# Patient Record
Sex: FEMALE | Race: BLACK OR AFRICAN AMERICAN | Employment: FULL TIME | URBAN - METROPOLITAN AREA
[De-identification: names, ages, dates, MRNs, and addresses within clinical notes are randomized per-mention and may not be internally consistent; named-entity substitution may affect disease eponyms.]

---

## 2020-02-04 ENCOUNTER — HOSPITAL ENCOUNTER (OUTPATIENT)
Dept: CT IMAGING | Age: 30
Discharge: HOME | End: 2020-02-04
Payer: COMMERCIAL

## 2020-02-04 PROCEDURE — 70450 CT HEAD/BRAIN W/O DYE: CPT

## 2020-11-20 ENCOUNTER — HOSPITAL ENCOUNTER (INPATIENT)
Age: 30
LOS: 1 days | Discharge: HOME | DRG: 159 | End: 2020-11-22
Attending: EMERGENCY MEDICINE | Admitting: INTERNAL MEDICINE
Payer: COMMERCIAL

## 2020-11-20 ENCOUNTER — APPOINTMENT (OUTPATIENT)
Dept: CT IMAGING | Age: 30
DRG: 159 | End: 2020-11-20
Payer: COMMERCIAL

## 2020-11-20 LAB
A/G RATIO: 1.4 (ref 1.1–2.2)
ALBUMIN SERPL-MCNC: 4.5 G/DL (ref 3.4–5)
ALP BLD-CCNC: 152 U/L (ref 40–129)
ALT SERPL-CCNC: 22 U/L (ref 10–40)
ANION GAP SERPL CALCULATED.3IONS-SCNC: 10 MMOL/L (ref 3–16)
AST SERPL-CCNC: 18 U/L (ref 15–37)
BASOPHILS ABSOLUTE: 0.1 K/UL (ref 0–0.2)
BASOPHILS RELATIVE PERCENT: 0.9 %
BILIRUB SERPL-MCNC: <0.2 MG/DL (ref 0–1)
BUN BLDV-MCNC: 13 MG/DL (ref 7–20)
C-REACTIVE PROTEIN: 24.9 MG/L (ref 0–5.1)
CALCIUM SERPL-MCNC: 9.1 MG/DL (ref 8.3–10.6)
CHLORIDE BLD-SCNC: 101 MMOL/L (ref 99–110)
CO2: 26 MMOL/L (ref 21–32)
CREAT SERPL-MCNC: 0.9 MG/DL (ref 0.6–1.1)
EOSINOPHILS ABSOLUTE: 0.7 K/UL (ref 0–0.6)
EOSINOPHILS RELATIVE PERCENT: 8.3 %
GFR AFRICAN AMERICAN: >60
GFR NON-AFRICAN AMERICAN: >60
GLOBULIN: 3.2 G/DL
GLUCOSE BLD-MCNC: 95 MG/DL (ref 70–99)
HCG QUALITATIVE: NEGATIVE
HCT VFR BLD CALC: 38.2 % (ref 36–48)
HEMOGLOBIN: 12.1 G/DL (ref 12–16)
LACTIC ACID: 0.6 MMOL/L (ref 0.4–2)
LYMPHOCYTES ABSOLUTE: 3.3 K/UL (ref 1–5.1)
LYMPHOCYTES RELATIVE PERCENT: 41.3 %
MCH RBC QN AUTO: 22.6 PG (ref 26–34)
MCHC RBC AUTO-ENTMCNC: 31.6 G/DL (ref 31–36)
MCV RBC AUTO: 71.5 FL (ref 80–100)
MONOCYTES ABSOLUTE: 0.5 K/UL (ref 0–1.3)
MONOCYTES RELATIVE PERCENT: 6.3 %
NEUTROPHILS ABSOLUTE: 3.5 K/UL (ref 1.7–7.7)
NEUTROPHILS RELATIVE PERCENT: 43.2 %
PDW BLD-RTO: 15.8 % (ref 12.4–15.4)
PLATELET # BLD: 226 K/UL (ref 135–450)
PMV BLD AUTO: 9.2 FL (ref 5–10.5)
POTASSIUM REFLEX MAGNESIUM: 3.7 MMOL/L (ref 3.5–5.1)
RBC # BLD: 5.34 M/UL (ref 4–5.2)
SEDIMENTATION RATE, ERYTHROCYTE: 14 MM/HR (ref 0–20)
SODIUM BLD-SCNC: 137 MMOL/L (ref 136–145)
TOTAL PROTEIN: 7.7 G/DL (ref 6.4–8.2)
WBC # BLD: 8.1 K/UL (ref 4–11)

## 2020-11-20 PROCEDURE — 96374 THER/PROPH/DIAG INJ IV PUSH: CPT

## 2020-11-20 PROCEDURE — 2500000003 HC RX 250 WO HCPCS: Performed by: NURSE PRACTITIONER

## 2020-11-20 PROCEDURE — 85652 RBC SED RATE AUTOMATED: CPT

## 2020-11-20 PROCEDURE — 6360000004 HC RX CONTRAST MEDICATION: Performed by: NURSE PRACTITIONER

## 2020-11-20 PROCEDURE — 36415 COLL VENOUS BLD VENIPUNCTURE: CPT

## 2020-11-20 PROCEDURE — 2580000003 HC RX 258: Performed by: NURSE PRACTITIONER

## 2020-11-20 PROCEDURE — 96375 TX/PRO/DX INJ NEW DRUG ADDON: CPT

## 2020-11-20 PROCEDURE — 86160 COMPLEMENT ANTIGEN: CPT

## 2020-11-20 PROCEDURE — 99285 EMERGENCY DEPT VISIT HI MDM: CPT

## 2020-11-20 PROCEDURE — 6360000002 HC RX W HCPCS: Performed by: NURSE PRACTITIONER

## 2020-11-20 PROCEDURE — 70487 CT MAXILLOFACIAL W/DYE: CPT

## 2020-11-20 PROCEDURE — 80053 COMPREHEN METABOLIC PANEL: CPT

## 2020-11-20 PROCEDURE — 85025 COMPLETE CBC W/AUTO DIFF WBC: CPT

## 2020-11-20 PROCEDURE — 96372 THER/PROPH/DIAG INJ SC/IM: CPT

## 2020-11-20 PROCEDURE — 83605 ASSAY OF LACTIC ACID: CPT

## 2020-11-20 PROCEDURE — 86140 C-REACTIVE PROTEIN: CPT

## 2020-11-20 PROCEDURE — 84703 CHORIONIC GONADOTROPIN ASSAY: CPT

## 2020-11-20 RX ORDER — DEXAMETHASONE SODIUM PHOSPHATE 10 MG/ML
10 INJECTION INTRAMUSCULAR; INTRAVENOUS ONCE
Status: COMPLETED | OUTPATIENT
Start: 2020-11-20 | End: 2020-11-20

## 2020-11-20 RX ORDER — DIPHENHYDRAMINE HYDROCHLORIDE 50 MG/ML
12.5 INJECTION INTRAMUSCULAR; INTRAVENOUS ONCE
Status: COMPLETED | OUTPATIENT
Start: 2020-11-20 | End: 2020-11-20

## 2020-11-20 RX ORDER — 0.9 % SODIUM CHLORIDE 0.9 %
1000 INTRAVENOUS SOLUTION INTRAVENOUS ONCE
Status: COMPLETED | OUTPATIENT
Start: 2020-11-20 | End: 2020-11-20

## 2020-11-20 RX ORDER — EPINEPHRINE 1 MG/ML
0.3 INJECTION, SOLUTION, CONCENTRATE INTRAVENOUS ONCE
Status: COMPLETED | OUTPATIENT
Start: 2020-11-20 | End: 2020-11-20

## 2020-11-20 RX ADMIN — DIPHENHYDRAMINE HYDROCHLORIDE 12.5 MG: 50 INJECTION, SOLUTION INTRAMUSCULAR; INTRAVENOUS at 19:37

## 2020-11-20 RX ADMIN — FAMOTIDINE 20 MG: 10 INJECTION, SOLUTION INTRAVENOUS at 19:37

## 2020-11-20 RX ADMIN — DEXAMETHASONE SODIUM PHOSPHATE 10 MG: 10 INJECTION INTRAMUSCULAR; INTRAVENOUS at 23:31

## 2020-11-20 RX ADMIN — EPINEPHRINE 0.3 MG: 1 INJECTION, SOLUTION, CONCENTRATE INTRAVENOUS at 22:29

## 2020-11-20 RX ADMIN — IOPAMIDOL 75 ML: 755 INJECTION, SOLUTION INTRAVENOUS at 21:10

## 2020-11-20 RX ADMIN — SODIUM CHLORIDE 1000 ML: 9 INJECTION, SOLUTION INTRAVENOUS at 19:36

## 2020-11-20 NOTE — LETTER
Felicia Sargent was admitted from 11/20/2020-11/22/2020. If you have any questions or concerns, please don't hesitate to call.       [facility]  [phone]

## 2020-11-20 NOTE — ED PROVIDER NOTES
[address] [facility]  ED  EMERGENCY DEPARTMENT ENCOUNTER        Pt Name: Tory Vela  MRN: [id number]  Nataliyagfabbie 1990  Date of evaluation: 11/20/2020  Provider: KATHY Camarena - NENA  PCP: Saima Morales MD     I have seen and evaluated this patient with my supervising physician Dr. Puentes       Chief Complaint   Patient presents with    Oral Swelling     Upper lip swelling. Pt started new birth control monday, otherwise no recent dietary/medication changes. No SOB, itching, hives, NKA. HISTORY OF PRESENT ILLNESS   (Location, Timing/Onset, Context/Setting, Quality, Duration, Modifying Factors, Severity, Associated Signs and Symptoms)  Note limiting factors. Tory Vela is a 26 y.o. female with medical history of asthma, HPV genital warts, hypertension, trichomonas, iron deficiency anemia who presents the ED with complaints of swelling to her left upper lip that has started this morning when patient awoke. She said she took Benadryl at 11 AM and did not notice any relief in the symptoms. She has noticed progressive swelling to her left upper lip that now extends into her left cheek since arrival to the ED. She does note to taking lisinopril and said her mother had experienced angioedema from lisinopril twice, however her entire upper lip was swollen. Patient did note to start a new birth control Elizabeth 5 days ago and currently has the Nexplanon in that is due to be removed next month. She denies any open wounds, scratches, injuries, or trauma. She denies any known allergies to any medications, detergents, lotions, or soaps. She does note to have sensitive skin and sometimes whenever she uses Chapstick it will make her face breakout. She denies any drooling, dysphagia, voice change, short of air, cough, wheezing, stridor, nausea, vomiting, diarrhea, headache, neck pain, visual disturbance, dental pain or swelling,fevers, or  hoarseness.   She says she is currently working from home. She notes that she did not take her blood pressure medicine today due to the swelling. She does smoke, denies alcohol use or street drugs. Nursing Notes were all reviewed and agreed with or any disagreements were addressed in the HPI. REVIEW OF SYSTEMS    (2-9 systems for level 4, 10 or more for level 5)     Review of Systems    Positives and Pertinent negatives as per HPI. Except as noted above in the ROS, all other systems were reviewed and negative. PAST MEDICAL HISTORY     Past Medical History:   Diagnosis Date    Asthma     Seasonal    HPV-genital warts 2009    Hypertension     Trichomoniasis          SURGICAL HISTORY   History reviewed. No pertinent surgical history. CURRENTMEDICATIONS       Previous Medications    ALBUTEROL SULFATE HFA (VENTOLIN HFA) 108 (90 BASE) MCG/ACT INHALER    Inhale 2 puffs into the lungs every 6 hours as needed for Wheezing    FERROUS SULFATE 325 (65 FE) MG TABLET    Take 325 mg by mouth daily (with breakfast)    IBUPROFEN (ADVIL;MOTRIN) 100 MG/5ML SUSPENSION    Take 40 mLs by mouth every 8 hours as needed for Pain (for mild pain level 1-3). IBUPROFEN (ADVIL;MOTRIN) 800 MG TABLET    Take 1 tablet by mouth every 8 hours as needed. IBUPROFEN (IBU) 600 MG TABLET    Take 1 tablet by mouth every 6 hours as needed for Pain. LISINOPRIL-HYDROCHLOROTHIAZIDE (PRINZIDE;ZESTORETIC) 20-25 MG PER TABLET    Take 1 tablet by mouth daily    ONDANSETRON (ZOFRAN) 4 MG TABLET    Take 1 tablet by mouth every 8 hours as needed for Nausea or Vomiting. TRIAMCINOLONE (KENALOG) 0.1 % CREAM    Apply topically 2 times daily Apply topically 2 times daily. ALLERGIES     Patient has no known allergies.     FAMILYHISTORY       Family History   Problem Relation Age of Onset    Asthma Mother     Miscarriages / [place] Mother     High Blood Pressure Father     Mental Illness Brother     Substance Abuse Maternal Grandmother     Diabetes Paternal Grandmother     High Blood Pressure Paternal Grandmother     Diabetes Paternal Grandfather     High Blood Pressure Paternal Grandfather           SOCIAL HISTORY       Social History     Tobacco Use    Smoking status: Current Every Day Smoker     Packs/day: 0.25     Years: 0.00     Pack years: 0.00     Types: Cigarettes    Smokeless tobacco: Never Used   Substance Use Topics    Alcohol use: No    Drug use: No       SCREENINGS    Elijah Coma Scale  Eye Opening: Spontaneous  Best Verbal Response: Oriented  Best Motor Response: Obeys commands  [place] Coma Scale Score: 15        PHYSICAL EXAM    (up to 7 for level 4, 8 or more for level 5)     ED Triage Vitals   BP Temp Temp Source Pulse Resp SpO2 Height Weight   11/20/20 1731 11/20/20 1727 11/20/20 1731 11/20/20 1727 11/20/20 1731 11/20/20 1727 11/20/20 1731 11/20/20 1731   (!) 133/90 96.9 °F (36.1 °C) Oral 109 16 99 % 5' 5\" (1.651 m) 183 lb (83 kg)       Physical Exam  Vitals signs and nursing note reviewed. Constitutional:       General: She is awake. Appearance: Normal appearance. She is well-developed and overweight. HENT:      Head: Normocephalic and atraumatic. Jaw: There is normal jaw occlusion. No trismus. Right Ear: Hearing normal.      Left Ear: Hearing normal.      Nose: Nose normal. No nasal deformity or signs of injury. Mouth/Throat:      Mouth: Mucous membranes are moist.      Dentition: Normal dentition. No dental abscesses. Pharynx: Oropharynx is clear. Uvula midline. Comments: Edema to left upper lip with extending into the left cheek causing flattening of the nasolabial fold. No edema into the hard or soft palate. No subungual edema. No voice change or drooling. No obvious dental infection or abscess. Eyes:      General:         Right eye: No discharge. Left eye: No discharge. Neck:      Musculoskeletal: Normal range of motion.    Cardiovascular:      Rate and Rhythm: Normal rate and regular rhythm. Heart sounds: Normal heart sounds. Pulmonary:      Effort: Pulmonary effort is normal. No respiratory distress. Breath sounds: Normal breath sounds. Abdominal:      General: Bowel sounds are normal.      Palpations: Abdomen is soft. Tenderness: There is no abdominal tenderness. Musculoskeletal: Normal range of motion. Lymphadenopathy:      Head:      Right side of head: No submandibular adenopathy. Left side of head: No submandibular adenopathy. Skin:     General: Skin is warm and dry. Coloration: Skin is not pale. Neurological:      Mental Status: She is alert and oriented to person, place, and time. Psychiatric:         Behavior: Behavior normal. Behavior is cooperative.          DIAGNOSTIC RESULTS   LABS:    Labs Reviewed   CBC WITH AUTO DIFFERENTIAL - Abnormal; Notable for the following components:       Result Value    RBC 5.34 (*)     MCV 71.5 (*)     MCH 22.6 (*)     RDW 15.8 (*)     Eosinophils Absolute 0.7 (*)     All other components within normal limits    Narrative:     Performed at:  [place] [address] [address]   Phone [phone]   COMPREHENSIVE METABOLIC PANEL W/ REFLEX TO MG FOR LOW K - Abnormal; Notable for the following components:    Alkaline Phosphatase 152 (*)     All other components within normal limits    Narrative:     Performed at:  [facility] - [place] [address] [address]   Phone [phone]   CULTURE, BLOOD 1   CULTURE, BLOOD 2   LACTIC ACID, PLASMA    Narrative:     Performed at:  [facility]) [place] [address] [address]   Phone [phone]   SEDIMENTATION RATE    Narrative:     Performed at:  [facility] - [place] [address] [address]   Phone [phone]   HCG, SERUM, QUALITATIVE    Narrative:     Performed at:  [facility] - [address], [address] [facility] [place]   Phone [phone]   C-REACTIVE PROTEIN       All other labs were within normal range or not returned as of this dictation. EKG: All EKG's are interpreted by the Emergency Department Physician in the absence of a cardiologist.  Please see their note for interpretation of EKG. RADIOLOGY:   Non-plain film images such as CT, Ultrasound and MRI are read by the radiologist. Plain radiographic images are visualized and preliminarily interpreted by the ED Provider with the below findings:        Interpretation per the Radiologist below, if available at the time of this note:    CT FACIAL BONES W CONTRAST   Final Result   Findings of cellulitis involving the left upper lip and face. No evidence of   abscess. No results found.         PROCEDURES   Unless otherwise noted below, none     Procedures    CRITICAL CARE TIME   N/A    CONSULTS:  IP CONSULT TO HOSPITALIST      EMERGENCY DEPARTMENT COURSE and DIFFERENTIAL DIAGNOSIS/MDM:   Vitals:    Vitals:    11/20/20 1947 11/20/20 1957 11/20/20 2048 11/20/20 2217   BP: (!) 123/90  (!) 140/93 (!) 127/93   Pulse: 58 58 63 58   Resp: 20 22 19   Temp:       TempSrc:       SpO2: 100%  100% 100%   Weight:       Height:           Patient was given the following medications:  Medications   dexamethasone (DECADRON) injection 10 mg (has no administration in time range)   clindamycin (CLEOCIN) 600 mg in dextrose 5% 50 mL IVPB (has no administration in time range)   diphenhydrAMINE (BENADRYL) injection 12.5 mg (12.5 mg Intravenous Given 11/20/20 1937)   famotidine (PEPCID) injection 20 mg (20 mg Intravenous Given 11/20/20 1937)   0.9 % sodium chloride bolus (0 mLs Intravenous Stopped 11/20/20 2057)   iopamidol (ISOVUE-370) 76 % injection 75 mL (75 mLs Intravenous Given 11/20/20 2110)   EPINEPHrine PF 1 MG/ML injection 0.3 mg (0.3 mg Intramuscular Given 11/20/20 2229)           Pertinent Labs & Imaging studies reviewed. (See chart for details)   -  Patient seen and evaluated in the emergency department. -  Triage and nursing notes reviewed and incorporated. -  Old chart records reviewed and incorporated. -  Patient case discussed with attending physician, Dr. Kevin Avilez. They saw and examined patient. -  Differential diagnosis includes: cellulitis, kaye's angina, dental abscess, angioedema, allergic reaction, vs COVID-19.  -  Work-up included:  See above blood culture x2, hCG, CBC, lactic acid, CMP, ESR, CRP, CT facial bones with contrast  -  ED treatment included: Pepcid, normal saline, epi, Decadron, Benadryl, clindamycin  - Consults: Hospitalist  -  Results discussed with patient. Labs show  CBC with WBC 8.1, RBC 5.34, MCH 22.6, RDW 15.8, MCV 71.5, absolute eosinophils 0.7. Lactic acid 0.6. CMP with alk phos 152. ESR 14. hCG negative. CT facial bones with contrast shows findings of cellulitis involving the left upper lip and face. No evidence of abscess. Patient was given a dose of epinephrine and rechecked. Is then noted that she now has angioedema to her right upper lip. Patient said this is much worse than previous. The patient is agreeable with plan of care and disposition.  -  Disposition:  Admission    FINAL IMPRESSION      1. Angioedema, initial encounter    2.  Essential hypertension          DISPOSITION/PLAN   DISPOSITION    Admission         (Please note that portions of this note were completed with a voice recognition program.  Efforts were made to edit the dictations but occasionally words are mis-transcribed.)    KATHY Garcia CNP (electronically signed)            KATHY Garcia CNP  11/21/20 9689

## 2020-11-21 PROBLEM — L03.90 CELLULITIS: Status: ACTIVE | Noted: 2020-11-21

## 2020-11-21 LAB
A/G RATIO: 1.3 (ref 1.1–2.2)
ALBUMIN SERPL-MCNC: 4.1 G/DL (ref 3.4–5)
ALP BLD-CCNC: 139 U/L (ref 40–129)
ALT SERPL-CCNC: 20 U/L (ref 10–40)
ANION GAP SERPL CALCULATED.3IONS-SCNC: 10 MMOL/L (ref 3–16)
AST SERPL-CCNC: 16 U/L (ref 15–37)
BASOPHILS ABSOLUTE: 0 K/UL (ref 0–0.2)
BASOPHILS RELATIVE PERCENT: 0.3 %
BILIRUB SERPL-MCNC: <0.2 MG/DL (ref 0–1)
BUN BLDV-MCNC: 9 MG/DL (ref 7–20)
C4 COMPLEMENT: 39.4 MG/DL (ref 10–40)
CALCIUM SERPL-MCNC: 8.9 MG/DL (ref 8.3–10.6)
CHLORIDE BLD-SCNC: 104 MMOL/L (ref 99–110)
CO2: 23 MMOL/L (ref 21–32)
CREAT SERPL-MCNC: 0.9 MG/DL (ref 0.6–1.1)
EOSINOPHILS ABSOLUTE: 0 K/UL (ref 0–0.6)
EOSINOPHILS RELATIVE PERCENT: 0.2 %
GAMMA GLUTAMYL TRANSFERASE: 64 U/L (ref 5–36)
GFR AFRICAN AMERICAN: >60
GFR NON-AFRICAN AMERICAN: >60
GLOBULIN: 3.2 G/DL
GLUCOSE BLD-MCNC: 162 MG/DL (ref 70–99)
HCT VFR BLD CALC: 36.2 % (ref 36–48)
HEMOGLOBIN: 11.6 G/DL (ref 12–16)
LYMPHOCYTES ABSOLUTE: 0.8 K/UL (ref 1–5.1)
LYMPHOCYTES RELATIVE PERCENT: 11.7 %
MCH RBC QN AUTO: 23.3 PG (ref 26–34)
MCHC RBC AUTO-ENTMCNC: 32.1 G/DL (ref 31–36)
MCV RBC AUTO: 72.5 FL (ref 80–100)
MONOCYTES ABSOLUTE: 0.1 K/UL (ref 0–1.3)
MONOCYTES RELATIVE PERCENT: 1.1 %
NEUTROPHILS ABSOLUTE: 5.8 K/UL (ref 1.7–7.7)
NEUTROPHILS RELATIVE PERCENT: 86.7 %
PDW BLD-RTO: 15.6 % (ref 12.4–15.4)
PLATELET # BLD: 182 K/UL (ref 135–450)
PMV BLD AUTO: 9.4 FL (ref 5–10.5)
POTASSIUM REFLEX MAGNESIUM: 4.3 MMOL/L (ref 3.5–5.1)
PROCALCITONIN: 0.05 NG/ML (ref 0–0.15)
RBC # BLD: 5 M/UL (ref 4–5.2)
SODIUM BLD-SCNC: 137 MMOL/L (ref 136–145)
TOTAL PROTEIN: 7.3 G/DL (ref 6.4–8.2)
WBC # BLD: 6.7 K/UL (ref 4–11)

## 2020-11-21 PROCEDURE — 1200000000 HC SEMI PRIVATE

## 2020-11-21 PROCEDURE — 85025 COMPLETE CBC W/AUTO DIFF WBC: CPT

## 2020-11-21 PROCEDURE — 6370000000 HC RX 637 (ALT 250 FOR IP): Performed by: INTERNAL MEDICINE

## 2020-11-21 PROCEDURE — 2500000003 HC RX 250 WO HCPCS: Performed by: NURSE PRACTITIONER

## 2020-11-21 PROCEDURE — 80053 COMPREHEN METABOLIC PANEL: CPT

## 2020-11-21 PROCEDURE — 36415 COLL VENOUS BLD VENIPUNCTURE: CPT

## 2020-11-21 PROCEDURE — 94150 VITAL CAPACITY TEST: CPT

## 2020-11-21 PROCEDURE — 2580000003 HC RX 258: Performed by: INTERNAL MEDICINE

## 2020-11-21 PROCEDURE — 6360000002 HC RX W HCPCS: Performed by: INTERNAL MEDICINE

## 2020-11-21 PROCEDURE — 82977 ASSAY OF GGT: CPT

## 2020-11-21 PROCEDURE — 84145 PROCALCITONIN (PCT): CPT

## 2020-11-21 RX ORDER — SODIUM CHLORIDE 0.9 % (FLUSH) 0.9 %
10 SYRINGE (ML) INJECTION EVERY 12 HOURS SCHEDULED
Status: DISCONTINUED | OUTPATIENT
Start: 2020-11-21 | End: 2020-11-22 | Stop reason: HOSPADM

## 2020-11-21 RX ORDER — FAMOTIDINE 20 MG/1
20 TABLET, FILM COATED ORAL DAILY PRN
Status: DISCONTINUED | OUTPATIENT
Start: 2020-11-21 | End: 2020-11-22 | Stop reason: HOSPADM

## 2020-11-21 RX ORDER — ACETAMINOPHEN 325 MG/1
650 TABLET ORAL EVERY 6 HOURS PRN
Status: DISCONTINUED | OUTPATIENT
Start: 2020-11-21 | End: 2020-11-22 | Stop reason: HOSPADM

## 2020-11-21 RX ORDER — POTASSIUM CHLORIDE 7.45 MG/ML
10 INJECTION INTRAVENOUS PRN
Status: DISCONTINUED | OUTPATIENT
Start: 2020-11-21 | End: 2020-11-22 | Stop reason: HOSPADM

## 2020-11-21 RX ORDER — SODIUM CHLORIDE 9 MG/ML
1000 INJECTION, SOLUTION INTRAVENOUS ONCE
Status: COMPLETED | OUTPATIENT
Start: 2020-11-21 | End: 2020-11-21

## 2020-11-21 RX ORDER — MAGNESIUM SULFATE IN WATER 40 MG/ML
2 INJECTION, SOLUTION INTRAVENOUS PRN
Status: DISCONTINUED | OUTPATIENT
Start: 2020-11-21 | End: 2020-11-22 | Stop reason: HOSPADM

## 2020-11-21 RX ORDER — CETIRIZINE HYDROCHLORIDE 10 MG/1
20 TABLET ORAL 2 TIMES DAILY
Status: DISCONTINUED | OUTPATIENT
Start: 2020-11-21 | End: 2020-11-22 | Stop reason: HOSPADM

## 2020-11-21 RX ORDER — NORGESTIMATE AND ETHINYL ESTRADIOL 0.25-0.035
.25-35 KIT ORAL DAILY
COMMUNITY
Start: 2020-11-16

## 2020-11-21 RX ORDER — ONDANSETRON 2 MG/ML
4 INJECTION INTRAMUSCULAR; INTRAVENOUS EVERY 6 HOURS PRN
Status: DISCONTINUED | OUTPATIENT
Start: 2020-11-21 | End: 2020-11-22 | Stop reason: HOSPADM

## 2020-11-21 RX ORDER — FERROUS SULFATE 325(65) MG
325 TABLET ORAL
Status: DISCONTINUED | OUTPATIENT
Start: 2020-11-21 | End: 2020-11-22 | Stop reason: HOSPADM

## 2020-11-21 RX ORDER — PROMETHAZINE HYDROCHLORIDE 25 MG/1
12.5 TABLET ORAL EVERY 6 HOURS PRN
Status: DISCONTINUED | OUTPATIENT
Start: 2020-11-21 | End: 2020-11-22 | Stop reason: HOSPADM

## 2020-11-21 RX ORDER — SODIUM CHLORIDE 0.9 % (FLUSH) 0.9 %
10 SYRINGE (ML) INJECTION PRN
Status: DISCONTINUED | OUTPATIENT
Start: 2020-11-21 | End: 2020-11-22 | Stop reason: HOSPADM

## 2020-11-21 RX ORDER — NICOTINE 21 MG/24HR
1 PATCH, TRANSDERMAL 24 HOURS TRANSDERMAL DAILY
Status: DISCONTINUED | OUTPATIENT
Start: 2020-11-21 | End: 2020-11-22 | Stop reason: HOSPADM

## 2020-11-21 RX ORDER — ALBUTEROL SULFATE 2.5 MG/3ML
2.5 SOLUTION RESPIRATORY (INHALATION) EVERY 4 HOURS PRN
Status: DISCONTINUED | OUTPATIENT
Start: 2020-11-21 | End: 2020-11-22 | Stop reason: HOSPADM

## 2020-11-21 RX ORDER — ACETAMINOPHEN 650 MG/1
650 SUPPOSITORY RECTAL EVERY 6 HOURS PRN
Status: DISCONTINUED | OUTPATIENT
Start: 2020-11-21 | End: 2020-11-22 | Stop reason: HOSPADM

## 2020-11-21 RX ORDER — PREDNISONE 20 MG/1
40 TABLET ORAL DAILY
Status: DISCONTINUED | OUTPATIENT
Start: 2020-11-21 | End: 2020-11-22 | Stop reason: HOSPADM

## 2020-11-21 RX ADMIN — VANCOMYCIN HYDROCHLORIDE 1000 MG: 1 INJECTION, POWDER, LYOPHILIZED, FOR SOLUTION INTRAVENOUS at 10:05

## 2020-11-21 RX ADMIN — CETIRIZINE HYDROCHLORIDE 20 MG: 10 TABLET, FILM COATED ORAL at 08:29

## 2020-11-21 RX ADMIN — FERROUS SULFATE TAB 325 MG (65 MG ELEMENTAL FE) 325 MG: 325 (65 FE) TAB at 08:30

## 2020-11-21 RX ADMIN — CEFTRIAXONE SODIUM 2 G: 2 INJECTION, POWDER, FOR SOLUTION INTRAMUSCULAR; INTRAVENOUS at 03:13

## 2020-11-21 RX ADMIN — PREDNISONE 40 MG: 20 TABLET ORAL at 08:29

## 2020-11-21 RX ADMIN — Medication 600 MG: at 01:35

## 2020-11-21 RX ADMIN — CETIRIZINE HYDROCHLORIDE 20 MG: 10 TABLET, FILM COATED ORAL at 03:20

## 2020-11-21 RX ADMIN — ENOXAPARIN SODIUM 40 MG: 40 INJECTION SUBCUTANEOUS at 08:30

## 2020-11-21 RX ADMIN — VANCOMYCIN HYDROCHLORIDE 1000 MG: 1 INJECTION, POWDER, LYOPHILIZED, FOR SOLUTION INTRAVENOUS at 17:38

## 2020-11-21 RX ADMIN — CETIRIZINE HYDROCHLORIDE 20 MG: 10 TABLET, FILM COATED ORAL at 20:25

## 2020-11-21 RX ADMIN — VANCOMYCIN HYDROCHLORIDE 1000 MG: 1 INJECTION, POWDER, LYOPHILIZED, FOR SOLUTION INTRAVENOUS at 04:06

## 2020-11-21 RX ADMIN — Medication 10 ML: at 20:25

## 2020-11-21 RX ADMIN — SODIUM CHLORIDE 1000 ML: 9 INJECTION, SOLUTION INTRAVENOUS at 03:20

## 2020-11-21 NOTE — H&P
Hospital Medicine History & Physical      PCP: Sveta Prakash MD    Date of Admission: 11/20/2020    Date of Service: Pt seen/examined on 11/20/2020  Pt seen/examined face to face on and admitted as inpatient with expected LOS greater than two midnights due to medical therapy  Chief Complaint:    Chief Complaint   Patient presents with    Oral Swelling     Upper lip swelling. Pt started new birth control monday, otherwise no recent dietary/medication changes. No SOB, itching, hives, NKA. History Of Present Illness:      26 y.o. female who presented to [facility] with past family history of angioedema, personal history of hypertension, asthma, iron deficiency anemia presented to the ED for left upper lip swelling. Patient reported she woke up this morning and noticed that she had a mass on her left lip that she could feel millimeters continue to progress from her left cheek till the right side called her doctor and her doctor told her to come to the ER immediately. Patient reports she never had a reaction like this however has been on lisinopril for 1 year. Patient also reports that she has her mom who also has ACE inhibitor angioedema. Patient also has 2 siblings which neither of them has had this reaction. Patient also started on a new birth control 5 days ago in addition to being on Nexplanon that is scheduled to be removed next month. Patient denied having any association with fever, redness, rash or shortness of breath chest pain abdominal pain dysuria or distention. Patient reports she works from home and support. Patient denies drinking, drugs. Admits to smoking daily. Past Medical History:          Diagnosis Date    Asthma     Seasonal    HPV-genital warts 2009    Hypertension     Trichomoniasis        Past Surgical History:      History reviewed. No pertinent surgical history.     Medications Prior to Admission:      Prior to Admission medications    Medication Sig Start Date End Date Taking? Authorizing Provider   [place] 3 0.25-35 MG-MCG per tablet Take 0.25-35 mg by mouth daily 11/16/20  Yes Historical Provider, MD   ferrous sulfate 325 (65 FE) MG tablet Take 325 mg by mouth daily (with breakfast)   Yes Historical Provider, MD   albuterol sulfate HFA (VENTOLIN HFA) 108 (90 BASE) MCG/ACT inhaler Inhale 2 puffs into the lungs every 6 hours as needed for Wheezing   Yes Historical Provider, MD   lisinopril-hydrochlorothiazide (PRINZIDE;ZESTORETIC) 20-25 MG per tablet Take 1 tablet by mouth daily   Yes Historical Provider, MD   triamcinolone (KENALOG) 0.1 % cream Apply topically 2 times daily Apply topically 2 times daily. Historical Provider, MD   ibuprofen (ADVIL;MOTRIN) 800 MG tablet Take 1 tablet by mouth every 8 hours as needed. 2/3/15   Tee Whaley MD   ibuprofen (ADVIL;MOTRIN) 100 MG/5ML suspension Take 40 mLs by mouth every 8 hours as needed for Pain (for mild pain level 1-3). 5/30/11 5/29/12  Marianela Castellanos MD       Allergies:  Patient has no known allergies. Social History:    TOBACCO:   reports that she has been smoking cigarettes. She has been smoking about 0.25 packs per day for the past 0.00 years. She has never used smokeless tobacco.  ETOH:   reports no history of alcohol use. E-Cigarettes Vaping or Juuling     Questions Responses    Vaping Use Never User    Start Date     Does device contain nicotine?      Quit Date     Vaping Type             Family History:      Reviewed in detail         Problem Relation Age of Onset    Asthma Mother     Miscarriages / [place] Mother     High Blood Pressure Father     Mental Illness Brother     Substance Abuse Maternal Grandmother     Diabetes Paternal Grandmother     High Blood Pressure Paternal Grandmother     Diabetes Paternal Grandfather     High Blood Pressure Paternal Grandfather        REVIEW OF SYSTEMS:     Constitutional:  No Fever, No Chills, No Night Sweats  ENT/Mouth:  No Nasal Congestion, No Hoarseness, No new mouth lesion  Eyes:  No Eye Pain, No Redness, No Discharge  Cardiovascular:  No Chest Pain, No Orthopnea, No Palpitations  Respiratory:  No Cough, No Sputum, No Dyspnea  Gastrointestinal:  No Nausea, No Vomiting, No Diarrhea,   Genitourinary: No Urinary Frequency, No Hematuria, No Urinary pain  Musculoskeletal:  No worsening Arthralgias, No worsening Myalgias  Skin:  No new Skin Lesions, No new skin rash  Neuro:  No new weakness, No new numbness. Psych:  No suicial ideation, No Violence ideation    PHYSICAL EXAM PERFORMED:    /84   Pulse 67   Temp 97.5 °F (36.4 °C) (Oral)   Resp 20   Ht 5' 5\" (1.651 m)   Wt 188 lb 4 oz (85.4 kg)   LMP  (Within Months)   SpO2 100%   BMI 31.33 kg/m²     General appearance:  mild acute distress, appears older than stated age  HEENT:   atraumatic, sclera anicteric, Conjunctivae clear. Bilateral upper lip edema left greater than the right with no erythema, abscess. I palpated her teeth with no cavities or abscesses noted. neck: Supple,Trachea midline, no goiter  Respiratory:  Normal respiratory effort. Clear to auscultation, bilaterally without Rales/Wheezes/Rhonchi. Cardiovascular:  Regular rate and rhythm without murmurs, capillary refill 2 seconds  Abdomen: Soft, non-tender, non-distended with normal bowel sounds. Musculoskeletal:  No clubbing, cyanosis or edema bilaterally. Skin: turgor normal.  No new rashes or lesions. Neurologic: Alert and oriented x4, no new focal sensory/motor deficits. Labs:     Recent Labs     11/20/20 1937   WBC 8.1   HGB 12.1   HCT 38.2        Recent Labs     11/20/20 1937      K 3.7      CO2 26   BUN 13   CREATININE 0.9   CALCIUM 9.1     Recent Labs     11/20/20 1937   AST 18   ALT 22   BILITOT <0.2   ALKPHOS 152*     No results for input(s): INR in the last 72 hours. No results for input(s): [place] in the last 72 hours.     Urinalysis:      Lab Results   Component Value Date NITRU Negative 04/12/2015    WBCUA 20-50 04/12/2015    BACTERIA 3+ 04/12/2015    RBCUA 3-5 04/12/2015    BLOODU LARGE 04/12/2015    SPECGRAV >=1.030 04/12/2015    GLUCOSEU Negative 04/12/2015    GLUCOSEU NEGATIVE 05/21/2011       Radiology:         CT FACIAL BONES W CONTRAST   Final Result   Findings of cellulitis involving the left upper lip and face. No evidence of   abscess. ASSESSMENT AND PLAN:    Active Hospital Problems    Diagnosis Date Noted    Cellulitis [L03.90] 11/21/2020     1. Upper Lip swelling:  Etiology unclear  Suspect Angioedema secondary to lisinopril induced(dc meds), hereditary  ordered C4 complement if low would support the diagosis. Due to the fast progression, orderred C1 concerntrate incase intubation is to occur. Also suspicious of cellulitis, inflammatory markers pending. I do no see erythema on skin exam mostly edema, and no other rash. Patient was given famotidine, Benadryl, epinephrine and Decadron in the ED. We will continue with cetirizine, prednisone Started empiric Vancomycin and ceftriaxone for cellulitis. Continue to monitor respiration status    2. Cigarrete smoking:  Nicotine patch    3. HTN: Held ACEI    Elevated ALP  GGT pending    Iron def anemia:  Iron replacement    Asthma: controlled not in exacerbation    Diet: NPO except meds ordered    Dispo:   Expected LOS greater than two [facility]

## 2020-11-21 NOTE — ED NOTES

## 2020-11-21 NOTE — ED PROVIDER NOTES
I independently performed a history and physical on [facility]. All diagnostic, treatment, and disposition decisions were made by myself in conjunction with the advanced practice provider. For further details of Jimy Olson 27 emergency department encounter, please see Lindy Kay NP's documentation. She is a 69-year-old female who woke up this morning and noticed that the left upper lip was swollen and has gradually worsened throughout the day. No other swelling to the face other than to the left upper lip. She states it is somewhat painful since it feels like her skin is stretching out due to the swelling. Her mother has a history of angioedema. She denies any prior symptoms like this. No swelling to the tongue or throat or neck. No visual changes. No chest pain or shortness of breath. She does normally take lisinopril although did not take it this morning. No vomiting or diarrhea. No abdominal pain. No hives or rash. She did recently start a new birth control a week ago. No headache. No fevers or chills. No sore throat. Due to concern for the worsening left upper lip swelling, she came to the ED for further evaluation. Physical:   Gen: No acute distress. AOx3. Psych: Normal mood and affect  HEENT: NCAT, EOMI, PERRL, MMM, swelling to the left upper lip noted but no overlying erythema, no pharyngeal erythema, no drooling, no trismus  Neck: supple, normal range of motion, nontender to palpation, no swelling noted to the neck  Cardiac: RRR, pulses 2+ in upper extremities  Lungs: C2AB, no R/R/W  Abdomen: soft and nontender with no R/D/G  Neuro: Moving extremities equally and no focal neuro deficits    MDM: Patient was evaluated due to concern for worsening left upper lip swelling that has progressed throughout the entire day but no other concerns for angioedema elsewhere. No concerns for airway compromise at this time.   We are considering angioedema from allergic reaction, angioedema from bradykinin reaction related to lisinopril, cellulitis, trauma, amongst other pathology. We will try an EpiPen to see if this helps with the swelling. No significant tenderness to palpation on exam and I have low suspicion for cellulitis at this point given no CT mention this. She had no fever or leukocytosis which was also reassuring. I believe the discomfort to the left upper lip is due to the swelling causing the pressure on her skin. After EpiPen administration, her symptoms continue to worsen with swelling of the face per nurse practitioner and therefore we do feel that it is in the patient's best interest to be admitted and observed overnight due to concern for worsening angioedema. No concern for airway compromise at this time. She is stable at time of admission and in no acute distress. She was also treated for clindamycin in case this is related to infection such as cellulitis. She received Decadron as well due to concern for inflammation.      Aurelia Stallings MD  11/21/20 0030

## 2020-11-21 NOTE — PROGRESS NOTES
4 Eyes Skin Assessment     The patient is being assess for  Admission    I agree that 2 RN's have performed a thorough Head to Toe Skin Assessment on the patient. ALL assessment sites listed below have been assessed. Areas assessed by both nurses:   [x]   Head, Face, and Ears   [x]   Shoulders, Back, and Chest  [x]   Arms, Elbows, and Hands   [x]   Coccyx, Sacrum, and Ischum  [x]   Legs, Feet, and Heels        Does the Patient have Skin Breakdown?   No         Kai Prevention initiated:  No   Wound Care Orders initiated:  No      [facility] nurse consulted for Pressure Injury (Stage 3,4, Unstageable, DTI, NWPT, and Complex wounds):  No      Nurse 1 eSignature: Electronically signed by Figueroa Melgar RN on 11/21/20 at 1:21 AM EST    **SHARE this note so that the co-signing nurse is able to place an eSignature**    Nurse 2 eSignature: Electronically signed by Param Wall RN on 11/21/20 at 4:42 AM EST

## 2020-11-21 NOTE — CONSULTS
Pharmacy Note  Vancomycin Consult    Manda Mills is a 26 y.o. female started on Vancomycin for SSTI of the mouth; consult received from Dr. Anant Younger manage therapy. Also receiving the following antibiotics: Rocephin and Clindamycin. Allergies:  Patient has no known allergies. Recent Labs     11/20/20 1937   CREATININE 0.9       Recent Labs     11/20/20 1937   WBC 8.1       Estimated Creatinine Clearance: 97 mL/min (based on SCr of 0.9 mg/dL). Intake/Output Summary (Last 24 hours) at 11/21/2020 0109  Last data filed at 11/20/2020 2057  Gross per 24 hour   Intake 1000 ml   Output --   Net 1000 ml       Wt Readings from Last 1 Encounters:   11/21/20 188 lb 4 oz (85.4 kg)         Body mass index is 31.33 kg/m². Culture Date      Source                       Results    Maintenance dose: 15 mg/kg (maximum: 2000 mg/dose and 4500 mg/day) starting at the next dosing interval determined by renal function  Pulse dose: fluctuating renal function, DEVEN, ESRD   Goal Vancomycin trough: 10-15 mcg/mL or 15-20 mcg/mL   Goal Vancomycin AUC: 400-600     Assessment/Plan:  Will initiate Vancomycin at 1000 mg q8h. Calculated . Vancomycin trough ordered for 11/22 at 0100. Timing of trough level will be determined based on culture results, renal function, and clinical response. Thank you for the consult. Jaspreet Velazquez, PharmD  11/21/2020 1:10 AM    11/22  Vanc trough = 10.4 mcg/mL at 0102. Continue current dose and frequency of vancomycin. Will monitor and adjust dose accordingly.   Jaspreet Velazquez, [place]  11/22/2020 1:55 AM

## 2020-11-21 NOTE — PROGRESS NOTES
Hospitalist Progress Note      PCP: Sawyer Jones MD    Date of Admission: 11/20/2020    Chief Complaint: Upper lip swelling    Hospital Course: Admitted with suspicion of upper lip cellulitis versus angioedema. Imaging is more consistent with cellulitis. Patient states she has not improved since yesterday. Subjective: No chest pain, no shortness of breath, no nausea or vomiting. Able to swallow food well. Medications:  Reviewed    Infusion Medications   Scheduled Medications    cefTRIAXone (ROCEPHIN) IV  2 g Intravenous Q24H    vancomycin  1,000 mg Intravenous Q8H    cetirizine  20 mg Oral BID    predniSONE  40 mg Oral Daily    sodium chloride flush  10 mL Intravenous 2 times per day    enoxaparin  40 mg Subcutaneous Daily    ferrous sulfate  325 mg Oral Daily with breakfast     PRN Meds: sodium chloride flush, potassium chloride, magnesium sulfate, acetaminophen **OR** acetaminophen, promethazine **OR** ondansetron, famotidine, albuterol      Intake/Output Summary (Last 24 hours) at 11/21/2020 1150  Last data filed at 11/20/2020 2057  Gross per 24 hour   Intake 1000 ml   Output --   Net 1000 ml       Physical Exam Performed:    BP (!) 137/99   Pulse 58   Temp 98 °F (36.7 °C) (Oral)   Resp 16   Ht 5' 5\" (1.651 m)   Wt 188 lb 4 oz (85.4 kg)   LMP  (Within Months)   SpO2 98%   BMI 31.33 kg/m²     General appearance: No apparent distress, appears stated age and cooperative. HEENT: Pupils equal, round, and reactive to light. Conjunctivae/corneas clear. Upper lip is swollen, slightly indurated and tender. No erythema noted today. Was erythematous yesterday. Neck: Supple, with full range of motion. No jugular venous distention. Trachea midline. Respiratory:  Normal respiratory effort. Clear to auscultation, bilaterally without Rales/Wheezes/Rhonchi. Cardiovascular: Regular rate and rhythm with normal S1/S2 without murmurs, rubs or gallops.   Abdomen: Soft, non-tender, non-distended with normal bowel sounds. Musculoskeletal: No clubbing, cyanosis or edema bilaterally. Full range of motion without deformity. Skin: Skin color, texture, turgor normal.  No rashes or lesions. Neurologic:  Neurovascularly intact without any focal sensory/motor deficits. Cranial nerves: II-XII intact, grossly non-focal.  Psychiatric: Alert and oriented, thought content appropriate, normal insight  Capillary Refill: Brisk,< 3 seconds   Peripheral Pulses: +2 palpable, equal bilaterally       Labs:   Recent Labs     11/20/20 1937 11/21/20 0614   WBC 8.1 6.7   HGB 12.1 11.6*   HCT 38.2 36.2    182     Recent Labs     11/20/20 1937 11/21/20 0614    137   K 3.7 4.3    104   CO2 26 23   BUN 13 9   CREATININE 0.9 0.9   CALCIUM 9.1 8.9     Recent Labs     11/20/20 1937 11/21/20 0614   AST 18 16   ALT 22 20   BILITOT <0.2 <0.2   ALKPHOS 152* 139*     No results for input(s): INR in the last 72 hours. No results for input(s): Edwin Clap in the last 72 hours. Urinalysis:      Lab Results   Component Value Date    NITRU Negative 04/12/2015    WBCUA 20-50 04/12/2015    BACTERIA 3+ 04/12/2015    RBCUA 3-5 04/12/2015    BLOODU LARGE 04/12/2015    SPECGRAV >=1.030 04/12/2015    GLUCOSEU Negative 04/12/2015    GLUCOSEU NEGATIVE 05/21/2011       Radiology:  CT FACIAL BONES W CONTRAST   Final Result   Findings of cellulitis involving the left upper lip and face. No evidence of   abscess. Assessment/Plan:    Active Hospital Problems    Diagnosis    Cellulitis [L03.90]     PLAN:    Upper lip swelling, more consistent with cellulitis  Angioedema suspected. This may be possible. However, physical exam and imaging are more consistent with cellulitis. Hold lisinopril  Continue IV antibiotics one more day  No need for n.p.o. Continue cetirizine and prednisone    Hypertension  Off ACE inhibitor. Consider alternatives if needed.     Mild anemia, iron deficiency  Continue oral iron    Asthma without exacerbation  On as needed albuterol.   Asymptomatic.     Discussed with the patient, questions answered    DVT Prophylaxis: Lovenox  Diet: DIET CARDIAC;  Code Status: Full Code    PT/OT Eval Status: Not needed    Dispo -home tomorrow if stable    Merle Phillips MD

## 2020-11-21 NOTE — ED NOTES
Writer gave report to Lyn Novoa at this time. Patient sent with personal belongings.      Tamia Key RN  11/21/20 8603

## 2020-11-21 NOTE — ED NOTES
Blood culture set #1 drawn from right peripheral with butterfly. Bottle tops scrubbed with alcohol pads. Site prepped with Prevantics swab, 15 seconds per side, and allowed to dry for 30 seconds prior to venipuncture. Red waste tube drawn prior to collection of specimen. Blood culture set #2 drawn from right peripheral with butterfly. Bottle tops scrubbed with alcohol pads. Site prepped with Prevantics swab, 15 seconds per side, and allowed to dry for 30 seconds prior to venipuncture. Red waste tube drawn prior to collection of specimen.            Patricia Wynn RN  11/20/20 7699

## 2020-11-21 NOTE — PROGRESS NOTES
Admission completed and charted. VSS. Lip swelling noted. Bed locked and in lowest position. Call light within reach. Pt denies any other needs at this time. Will continue to monitor.

## 2020-11-21 NOTE — PROGRESS NOTES
RESPIRATORY THERAPY ASSESSMENT    Name:  Denae Mead Record Number:  [id number]  Age: 26 y.o. Gender: female  : 1990  Today's Date:  2020  Room:  5326/7635-06    Assessment     Is the patient being admitted for a COPD or Asthma exacerbation? No   (If yes the patient will be seen every 4 hours for the first 24 hours and then reassessed)    Patient Admission Diagnosis      Allergies  No Known Allergies    Minimum Predicted Vital Capacity:     855          Actual Vital Capacity:      1000              Pulmonary History:asthma, current smoker  Home Oxygen Therapy:  room air  Home Respiratory Therapy:Albuterol prn  Current Respiratory Therapy:  albuterol  Treatment Type: IS       Respiratory Severity Index(RSI)   Patients with orders for inhalation medications, oxygen, or any therapeutic treatment modality will be placed on Respiratory Protocol. They will be assessed with the first treatment and at least every 72 hours thereafter. The following severity scale will be used to determine frequency of treatment intervention. Smoking History: Smoking History Less than 1ppd or less than 15 pack year = 1    Social History  Social History     Tobacco Use    Smoking status: Current Every Day Smoker     Packs/day: 0.25     Years: 0.00     Pack years: 0.00     Types: Cigarettes    Smokeless tobacco: Never Used   Substance Use Topics    Alcohol use: No    Drug use: No       Recent Surgical History: None = 0  Past Surgical History  History reviewed. No pertinent surgical history.     Level of Consciousness: Alert, Oriented, and Cooperative = 0    Level of Activity: Walking unassisted = 0    Respiratory Pattern: Regular Pattern; RR 8-20 = 0    Breath Sounds: Clear = 0    Sputum   ,  ,    Cough: Strong, spontaneous, non-productive = 0    Vital Signs   BP (!) 135/97   Pulse 73   Temp 97.5 °F (36.4 °C) (Oral)   Resp 20   Ht 5' 5\" (1.651 m)   Wt 188 lb 4 oz (85.4 kg)   LMP  (Within Months) SpO2 97%   BMI 31.33 kg/m²   SPO2 (COPD values may differ): Greater than or equal to 92% on room air = 0    Peak Flow (asthma only): not applicable = 0    RSI: 0-4 = See once and convert to home regimen or discontinue        Plan       Goals: medication delivery    Patient/caregiver was educated on the proper method of use for Respiratory Care Devices:  Yes      Level of patient/caregiver understanding able to:   ? Verbalize understanding   ? Demonstrate understanding       ? Teach back        ? Needs reinforcement       ? No available caregiver               ? Other:     Response to education:  Excellent     Is patient being placed on Home Treatment Regimen? Yes     Does the patient have everything they need prior to discharge? NA     Comments: pt seen and chart reviewed    Plan of Care: albuterol prn    Electronically signed by Victoria Noland RCP on 11/21/2020 at 4:30 AM    Respiratory Protocol Guidelines     1. Assessment and treatment by Respiratory Therapy will be initiated for medication and therapeutic interventions upon initiation of aerosolized medication. 2. Physician will be contacted for respiratory rate (RR) greater than 35 breaths per minute. Therapy will be held for heart rate (HR) greater than 140 beats per minute, pending direction from physician. 3. Bronchodilators will be administered via Metered Dose Inhaler (MDI) with spacer when the following criteria are met:  a. Alert and cooperative     b. HR < 140 bpm  c. RR < 30 bpm                d. Can demonstrate a 2-3 second inspiratory hold  4. Bronchodilators will be administered via Hand Held Nebulizer DALI [facility]) to patients when ANY of the following criteria are met  a. Incognizant or uncooperative          b. Patients treated with HHN at Home        c. Unable to demonstrate proper use of MDI with spacer     d. RR > 30 bpm   5.  Bronchodilators will be delivered via Metered Dose Inhaler (MDI), HHN, Aerogen to intubated patients on mechanical ventilation. 6. Inhalation medication orders will be delivered and/or substituted as outlined below. Aerosolized Medications Ordering and Administration Guidelines:    1. All Medications will be ordered by a physician, and their frequency and/or modality will be adjusted as defined by the patients Respiratory Severity Index (RSI) score. 2. If the patient does not have documented COPD, consider discontinuing anticholinergics when RSI is less than 9.  3. If the bronchospasm worsens (increased RSI), then the bronchodilator frequency can be increased to a maximum of every 4 hours. If greater than every 4 hours is required, the physician will be contacted. 4. If the bronchospasm improves, the frequency of the bronchodilator can be decreased, based on the patient's RSI, but not less than home treatment regimen frequency. 5. Bronchodilator(s) will be discontinued if patient has a RSI less than 9 and has received no scheduled or as needed treatment for 72  Hrs. Patients Ordered on a Mucolytic Agent:    1. Must always be administered with a bronchodilator. 2. Discontinue if patient experiences worsened bronchospasm, or secretions have lessened to the point that the patient is able to clear them with a cough. Anti-inflammatory and Combination Medications:    1. If the patient lacks prior history of lung disease, is not using inhaled anti-inflammatory medication at home, and lacks wheezing by examination or by history for at least 24 hours, contact physician for possible discontinuation.

## 2020-11-22 VITALS
OXYGEN SATURATION: 98 % | RESPIRATION RATE: 16 BRPM | BODY MASS INDEX: 31.36 KG/M2 | TEMPERATURE: 98.2 F | HEART RATE: 70 BPM | WEIGHT: 188.25 LBS | HEIGHT: 65 IN | DIASTOLIC BLOOD PRESSURE: 82 MMHG | SYSTOLIC BLOOD PRESSURE: 136 MMHG

## 2020-11-22 LAB
A/G RATIO: 1.3 (ref 1.1–2.2)
ALBUMIN SERPL-MCNC: 3.8 G/DL (ref 3.4–5)
ALP BLD-CCNC: 126 U/L (ref 40–129)
ALT SERPL-CCNC: 17 U/L (ref 10–40)
ANION GAP SERPL CALCULATED.3IONS-SCNC: 9 MMOL/L (ref 3–16)
AST SERPL-CCNC: 13 U/L (ref 15–37)
BASOPHILS ABSOLUTE: 0.1 K/UL (ref 0–0.2)
BASOPHILS RELATIVE PERCENT: 0.8 %
BILIRUB SERPL-MCNC: <0.2 MG/DL (ref 0–1)
BUN BLDV-MCNC: 8 MG/DL (ref 7–20)
CALCIUM SERPL-MCNC: 8.8 MG/DL (ref 8.3–10.6)
CHLORIDE BLD-SCNC: 106 MMOL/L (ref 99–110)
CO2: 25 MMOL/L (ref 21–32)
CREAT SERPL-MCNC: 0.8 MG/DL (ref 0.6–1.1)
EOSINOPHILS ABSOLUTE: 0.2 K/UL (ref 0–0.6)
EOSINOPHILS RELATIVE PERCENT: 1.4 %
GFR AFRICAN AMERICAN: >60
GFR NON-AFRICAN AMERICAN: >60
GLOBULIN: 2.9 G/DL
GLUCOSE BLD-MCNC: 139 MG/DL (ref 70–99)
HCT VFR BLD CALC: 36.5 % (ref 36–48)
HEMOGLOBIN: 11.4 G/DL (ref 12–16)
LYMPHOCYTES ABSOLUTE: 2.8 K/UL (ref 1–5.1)
LYMPHOCYTES RELATIVE PERCENT: 23.3 %
MCH RBC QN AUTO: 22.3 PG (ref 26–34)
MCHC RBC AUTO-ENTMCNC: 31.3 G/DL (ref 31–36)
MCV RBC AUTO: 71.3 FL (ref 80–100)
MONOCYTES ABSOLUTE: 0.7 K/UL (ref 0–1.3)
MONOCYTES RELATIVE PERCENT: 5.7 %
NEUTROPHILS ABSOLUTE: 8.2 K/UL (ref 1.7–7.7)
NEUTROPHILS RELATIVE PERCENT: 68.8 %
PDW BLD-RTO: 15.6 % (ref 12.4–15.4)
PLATELET # BLD: 192 K/UL (ref 135–450)
PMV BLD AUTO: 9.3 FL (ref 5–10.5)
POTASSIUM REFLEX MAGNESIUM: 4 MMOL/L (ref 3.5–5.1)
RBC # BLD: 5.12 M/UL (ref 4–5.2)
SODIUM BLD-SCNC: 140 MMOL/L (ref 136–145)
TOTAL PROTEIN: 6.7 G/DL (ref 6.4–8.2)
VANCOMYCIN TROUGH: 10.4 UG/ML (ref 10–20)
WBC # BLD: 12 K/UL (ref 4–11)

## 2020-11-22 PROCEDURE — 36415 COLL VENOUS BLD VENIPUNCTURE: CPT

## 2020-11-22 PROCEDURE — 85025 COMPLETE CBC W/AUTO DIFF WBC: CPT

## 2020-11-22 PROCEDURE — 6360000002 HC RX W HCPCS: Performed by: INTERNAL MEDICINE

## 2020-11-22 PROCEDURE — 6370000000 HC RX 637 (ALT 250 FOR IP): Performed by: INTERNAL MEDICINE

## 2020-11-22 PROCEDURE — 80053 COMPREHEN METABOLIC PANEL: CPT

## 2020-11-22 PROCEDURE — 2580000003 HC RX 258: Performed by: INTERNAL MEDICINE

## 2020-11-22 PROCEDURE — 80202 ASSAY OF VANCOMYCIN: CPT

## 2020-11-22 RX ORDER — CETIRIZINE HYDROCHLORIDE 10 MG/1
10 TABLET ORAL DAILY
Qty: 10 TABLET | Refills: 0 | Status: SHIPPED | OUTPATIENT
Start: 2020-11-22

## 2020-11-22 RX ORDER — AMOXICILLIN AND CLAVULANATE POTASSIUM 875; 125 MG/1; MG/1
1 TABLET, FILM COATED ORAL 2 TIMES DAILY
Qty: 10 TABLET | Refills: 0 | Status: SHIPPED | OUTPATIENT
Start: 2020-11-22 | End: 2020-11-27

## 2020-11-22 RX ORDER — AMLODIPINE BESYLATE 5 MG/1
5 TABLET ORAL DAILY
Qty: 30 TABLET | Refills: 3 | Status: SHIPPED | OUTPATIENT
Start: 2020-11-22

## 2020-11-22 RX ORDER — PREDNISONE 20 MG/1
40 TABLET ORAL DAILY
Qty: 6 TABLET | Refills: 0 | Status: SHIPPED | OUTPATIENT
Start: 2020-11-22 | End: 2020-11-25

## 2020-11-22 RX ADMIN — CEFTRIAXONE SODIUM 2 G: 2 INJECTION, POWDER, FOR SOLUTION INTRAMUSCULAR; INTRAVENOUS at 00:01

## 2020-11-22 RX ADMIN — VANCOMYCIN HYDROCHLORIDE 1000 MG: 1 INJECTION, POWDER, LYOPHILIZED, FOR SOLUTION INTRAVENOUS at 01:58

## 2020-11-22 RX ADMIN — FERROUS SULFATE TAB 325 MG (65 MG ELEMENTAL FE) 325 MG: 325 (65 FE) TAB at 09:23

## 2020-11-22 RX ADMIN — PREDNISONE 40 MG: 20 TABLET ORAL at 09:23

## 2020-11-22 RX ADMIN — CETIRIZINE HYDROCHLORIDE 20 MG: 10 TABLET, FILM COATED ORAL at 09:23

## 2020-11-22 NOTE — PROGRESS NOTES
Shift assessment completed and charted. VSS. Lip swelling improved. Bed locked and in lowest position. Call light within reach. Pt denies any other needs at this time. Will continue to monitor.

## 2020-11-22 NOTE — PROGRESS NOTES
Patient given discharge instructions, verbalized understanding. IV removed. Scripts sent with patient. Patient taken to vehicle.

## 2020-11-22 NOTE — DISCHARGE SUMMARY
Hospital Medicine Discharge Summary    Patient ID: Darlyn Pal      Patient's PCP: Elana Brock MD    Admit Date: 11/20/2020     Discharge Date:   11/22/20     Admitting Physician: Mylene Magaña DO     Discharge Physician: Dilip Vu MD     Discharge Diagnoses: Active Hospital Problems    Diagnosis    Cellulitis [L03.90]   Cellulitis of upper lip  Presumed angioedema  Hypertension    The patient was seen and examined on day of discharge and this discharge summary is in conjunction with any daily progress note from day of discharge. Hospital Course:     Patient was admitted with suspicion of upper lip cellulitis versus angioedema. Imaging is more consistent with cellulitis. However, clinical presentation is more consistent with angioedema. Patient was started on antibiotics and steroids in addition to antihistamines. First 24 hours were without any improvement in clinical picture and patient's complaints. Patient improved significantly in the next 24 hours. On the day of discharge, swelling was almost completely down. Patient did not have any problem in breathing or swallowing. Will be discharged home on oral antibiotics and oral steroids. ACE inhibitor stopped. Recommend Norvasc as antihypertensive, at least for now. Primary care physician to reassess and determine necessity for specific classes of antihypertensives. I recommend follow-up next week. Physical Exam Performed:     /82   Pulse 70   Temp 98.2 °F (36.8 °C) (Oral)   Resp 16   Ht 5' 5\" (1.651 m)   Wt 188 lb 4 oz (85.4 kg)   LMP  (Within Months)   SpO2 98%   BMI 31.33 kg/m²       General appearance:  No apparent distress, appears stated age and cooperative. HEENT:  Normal cephalic, atraumatic without obvious deformity. Pupils equal, round, and reactive to light. Extra ocular muscles intact. Conjunctivae/corneas clear. Upper lip swelling down by approximately 90%.   Neck: Supple, with full range of motion. No jugular venous distention. Trachea midline. Respiratory:  Normal respiratory effort. Clear to auscultation, bilaterally without Rales/Wheezes/Rhonchi. Cardiovascular:  Regular rate and rhythm with normal S1/S2 without murmurs, rubs or gallops. Abdomen: Soft, non-tender, non-distended with normal bowel sounds. Musculoskeletal:  No clubbing, cyanosis or edema bilaterally. Full range of motion without deformity. Skin: Skin color, texture, turgor normal.  No rashes or lesions. Neurologic:  Neurovascularly intact without any focal sensory/motor deficits. Cranial nerves: II-XII intact, grossly non-focal.  Psychiatric:  Alert and oriented, thought content appropriate, normal insight  Capillary Refill: Brisk,< 3 seconds   Peripheral Pulses: +2 palpable, equal bilaterally       Labs: For convenience and continuity at follow-up the following most recent labs are provided:      CBC:    Lab Results   Component Value Date    WBC 12.0 11/22/2020    HGB 11.4 11/22/2020    HCT 36.5 11/22/2020     11/22/2020       Renal:    Lab Results   Component Value Date     11/22/2020    K 4.0 11/22/2020     11/22/2020    CO2 25 11/22/2020    BUN 8 11/22/2020    CREATININE 0.8 11/22/2020    CALCIUM 8.8 11/22/2020         Significant Diagnostic Studies    Radiology:   CT FACIAL BONES W CONTRAST   Final Result   Findings of cellulitis involving the left upper lip and face. No evidence of   abscess.                 Consults:     IP CONSULT TO HOSPITALIST  PHARMACY TO DOSE VANCOMYCIN    Disposition: Home    Condition at Discharge: Stable    Discharge Instructions/Follow-up: Primary care physician sometime next week    Code Status:  Full Code     Activity: activity as tolerated    Diet: regular diet      Discharge Medications:     Current Discharge Medication List           Details   cetirizine (ZYRTEC) 10 MG tablet Take 1 tablet by mouth daily  Qty: 10 tablet, Refills: 0      predniSONE (DELTASONE) 20 MG tablet Take 2 tablets by mouth daily for 3 days  Qty: 6 tablet, Refills: 0      amoxicillin-clavulanate (AUGMENTIN) 875-125 MG per tablet Take 1 tablet by mouth 2 times daily for 5 days  Qty: 10 tablet, Refills: 0      amLODIPine (NORVASC) 5 MG tablet Take 1 tablet by mouth daily  Qty: 30 tablet, Refills: 3              Details   NELSY 0.25-35 MG-MCG per tablet Take 0.25-35 mg by mouth daily      ferrous sulfate 325 (65 FE) MG tablet Take 325 mg by mouth daily (with breakfast)      albuterol sulfate HFA (VENTOLIN HFA) 108 (90 BASE) MCG/ACT inhaler Inhale 2 puffs into the lungs every 6 hours as needed for Wheezing      triamcinolone (KENALOG) 0.1 % cream Apply topically 2 times daily Apply topically 2 times daily. ibuprofen (ADVIL;MOTRIN) 800 MG tablet Take 1 tablet by mouth every 8 hours as needed. Qty: 120 tablet, Refills: 3             Time Spent on discharge is more than 45 minutes in the examination, evaluation, counseling and review of medications and discharge plan. Signed:    Silas Montgomery MD   11/22/2020      Thank you Meka Guido MD for the opportunity to be involved in this patient's care. If you have any questions or concerns please feel free to contact me at [phone].

## 2021-08-24 ENCOUNTER — OFFICE VISIT (OUTPATIENT)
Dept: ENDOCRINOLOGY | Age: 31
End: 2021-08-24
Payer: COMMERCIAL

## 2021-08-24 VITALS
WEIGHT: 185 LBS | HEIGHT: 65 IN | BODY MASS INDEX: 30.82 KG/M2 | DIASTOLIC BLOOD PRESSURE: 95 MMHG | SYSTOLIC BLOOD PRESSURE: 150 MMHG | OXYGEN SATURATION: 98 % | HEART RATE: 93 BPM

## 2021-08-24 DIAGNOSIS — R74.8 ELEVATED ALKALINE PHOSPHATASE LEVEL: Primary | ICD-10-CM

## 2021-08-24 PROCEDURE — 99243 OFF/OP CNSLTJ NEW/EST LOW 30: CPT | Performed by: INTERNAL MEDICINE

## 2021-08-24 RX ORDER — TRAZODONE HYDROCHLORIDE 50 MG/1
TABLET ORAL
COMMUNITY
Start: 2021-08-18

## 2021-08-24 NOTE — PROGRESS NOTES
Subjective:      25 y/o WF who is here for elevated alkaline phosphatase    Referred by Dr. Bill Larson    Per records, she had alkaline phosphatase level elevated twice. GGT was normal    Intermittent elevation for years    11/20 152/139 H  Repeat normal  1/15  250 H  pregnancy  7/14  81  5/11 322 H  pregnancy    Calcium normal  AST/ALT normal    Moderate, uncontrolled no worsening factors    No h/o drinking    No h/o fracture  No bone disease    No vitamin D supplement    Current complaints: back pain/neck pain    Reports h/o thalassemia    CT 2015 no liver issue mentioned    Past Medical History:   Diagnosis Date    Asthma     Seasonal    HPV-genital warts 2009    Hypertension     Trichomoniasis      History reviewed. No pertinent surgical history. Current Outpatient Medications   Medication Sig Dispense Refill    traZODone (DESYREL) 50 MG tablet TAKE 1 TABLET BY MOUTH EVERY DAY AFTER MEALS      cetirizine (ZYRTEC) 10 MG tablet Take 1 tablet by mouth daily 10 tablet 0    amLODIPine (NORVASC) 5 MG tablet Take 1 tablet by mouth daily 30 tablet 3    NELSY 0.25-35 MG-MCG per tablet Take 0.25-35 mg by mouth daily      ferrous sulfate 325 (65 FE) MG tablet Take 325 mg by mouth daily (with breakfast)      albuterol sulfate HFA (VENTOLIN HFA) 108 (90 BASE) MCG/ACT inhaler Inhale 2 puffs into the lungs every 6 hours as needed for Wheezing      triamcinolone (KENALOG) 0.1 % cream Apply topically 2 times daily Apply topically 2 times daily.  ibuprofen (ADVIL;MOTRIN) 800 MG tablet Take 1 tablet by mouth every 8 hours as needed. 120 tablet 3     No current facility-administered medications for this visit.        Review of Systems  Please see scanned     Objective:    BP (!) 150/95   Pulse 93   Ht 5' 5\" (1.651 m)   Wt 185 lb (83.9 kg)   SpO2 98%   Breastfeeding No   BMI 30.79 kg/m²   Wt Readings from Last 3 Encounters:   08/24/21 185 lb (83.9 kg)   11/21/20 188 lb 4 oz (85.4 kg)   11/06/16 140 lb (63.5 kg) Constitutional: Well-developed, appears stated age, cooperative, in no acute distress  H/E/N/M/T:atraumatic, normocephalic, external ears, nose, lips normal without lesions  Eyes: Lids, lashes, conjunctivae and sclerae normal, No proptosis, no lid lag, no stare, no periorbital edema, extraocular movements intact. Neck: supple, symmetrical, trachea midline   Thyroid: gland size normal, non-tender on palpation  Respiratory: clear to auscultation bilaterally and breathing is unlabored  Cardiovascular: regular rate and rhythm, S1, S2, regular rate and rhythm, no murmur, rub or gallop. Skin: No obvious rashes or lesions present. Skin and hair texture normal  Psychiatric: Judgement and Insight:  judgement and insight appear normal  Neuro: Normal without focal findings, speech is normal normal, speech is spontaneous, and movements are coordinated, alert and oriented x3    Lab Review  No results found for: TSH  No results found for: FREET4      Assessment: 1. Elevated Alkaline Phosphatase: She has it for years, intermittent. ALT/AST normal.Last level slightly elevated. It has been higher in the past but was pregnant at that time. Will check bone specific alkaline phosphatase. Evaluate parathyroid gland and check Vitamin D level  May do bone scan for furthur evaluation     Plan: 1. Bone specific alkaline phosphatase and iso enzymes  2. Vitamin D level  3. PTH and TSH  4. Order Bone scan based on results

## 2021-09-09 ENCOUNTER — HOSPITAL ENCOUNTER (OUTPATIENT)
Age: 31
Discharge: HOME | End: 2021-09-09
Payer: COMMERCIAL

## 2021-09-09 DIAGNOSIS — R74.8 ELEVATED ALKALINE PHOSPHATASE LEVEL: ICD-10-CM

## 2021-09-09 LAB
A/G RATIO: 1.2 (ref 1.1–2.2)
ALBUMIN SERPL-MCNC: 4.3 G/DL (ref 3.4–5)
ALP BLD-CCNC: 163 U/L (ref 40–129)
ALT SERPL-CCNC: 12 U/L (ref 10–40)
ANION GAP SERPL CALCULATED.3IONS-SCNC: 10 MMOL/L (ref 3–16)
AST SERPL-CCNC: 15 U/L (ref 15–37)
BILIRUB SERPL-MCNC: <0.2 MG/DL (ref 0–1)
BUN BLDV-MCNC: 11 MG/DL (ref 7–20)
CALCIUM SERPL-MCNC: 9.2 MG/DL (ref 8.3–10.6)
CHLORIDE BLD-SCNC: 102 MMOL/L (ref 99–110)
CO2: 24 MMOL/L (ref 21–32)
CREAT SERPL-MCNC: 0.9 MG/DL (ref 0.6–1.1)
GFR AFRICAN AMERICAN: >60
GFR NON-AFRICAN AMERICAN: >60
GLOBULIN: 3.7 G/DL
GLUCOSE BLD-MCNC: 82 MG/DL (ref 70–99)
PARATHYROID HORMONE INTACT: 28.4 PG/ML (ref 14–72)
POTASSIUM SERPL-SCNC: 4.1 MMOL/L (ref 3.5–5.1)
REASON FOR REJECTION: NORMAL
REJECTED TEST: NORMAL
SODIUM BLD-SCNC: 136 MMOL/L (ref 136–145)
TOTAL PROTEIN: 8 G/DL (ref 6.4–8.2)
TSH REFLEX: 0.92 UIU/ML (ref 0.27–4.2)
VITAMIN D 25-HYDROXY: 19.1 NG/ML

## 2021-09-09 PROCEDURE — 83970 ASSAY OF PARATHORMONE: CPT

## 2021-09-09 PROCEDURE — 84443 ASSAY THYROID STIM HORMONE: CPT

## 2021-09-09 PROCEDURE — 80053 COMPREHEN METABOLIC PANEL: CPT

## 2021-09-09 PROCEDURE — 82306 VITAMIN D 25 HYDROXY: CPT

## 2021-09-09 PROCEDURE — 36415 COLL VENOUS BLD VENIPUNCTURE: CPT

## 2021-09-11 ENCOUNTER — HOSPITAL ENCOUNTER (OUTPATIENT)
Age: 31
Setting detail: SPECIMEN
Discharge: HOME | End: 2021-09-11
Payer: COMMERCIAL

## 2021-09-11 PROCEDURE — 84080 ASSAY ALKALINE PHOSPHATASES: CPT

## 2021-09-11 PROCEDURE — 84075 ASSAY ALKALINE PHOSPHATASE: CPT

## 2021-09-12 LAB — ALK PHOS BONE SPECIFIC: 34.7 UG/L

## 2021-09-14 ENCOUNTER — TELEPHONE (OUTPATIENT)
Dept: ENDOCRINOLOGY | Age: 31
End: 2021-09-14

## 2021-09-14 RX ORDER — ERGOCALCIFEROL (VITAMIN D2) 1250 MCG
50000 CAPSULE ORAL WEEKLY
Qty: 12 CAPSULE | Refills: 1 | Status: SHIPPED | OUTPATIENT
Start: 2021-09-14 | End: 2022-02-08 | Stop reason: SDUPTHER

## 2021-09-14 NOTE — PROGRESS NOTES
Alk phos 163  Bone specific 34.7  Vit D 19  PTH nl    Start ergocalciferol 50,000 IU weekly    Recheck in 3 months    If level still high, order bone scan

## 2021-09-15 DIAGNOSIS — R74.8 ELEVATED ALKALINE PHOSPHATASE LEVEL: Primary | ICD-10-CM

## 2021-09-15 LAB
ALK PHOS OTHER CALC: 0 U/L
ALK PHOSPHATASE: 165 U/L (ref 40–120)
ALKALINE PHOSPHATASE BONE FRACTION: 79 U/L (ref 0–55)
ALKALINE PHOSPHATASE LIVER FRACTION: 86 U/L (ref 0–94)

## 2021-09-15 NOTE — TELEPHONE ENCOUNTER
Ordered the bone scan, if she would like to have it done sooner.  She should still take the vitamin D with repeat labs in 3 months

## 2021-09-15 NOTE — TELEPHONE ENCOUNTER
Called PT and she said she understands but that she had talked to her PCP and they are trying to rule out problems and if she might have bone cancer she don't want to wait until dec, I explained to her that a dexa does not detect bone cancer but she still would like to go ahead with the test.

## 2021-09-15 NOTE — TELEPHONE ENCOUNTER
She may have misunderstood. I had advised her to start vitamin D and I would like to see her back in 3 months with labs before visit.   If level still high, will plan for bone scan

## 2021-09-21 ENCOUNTER — HOSPITAL ENCOUNTER (OUTPATIENT)
Dept: NUCLEAR MEDICINE | Age: 31
Discharge: HOME | End: 2021-09-21
Payer: COMMERCIAL

## 2021-09-21 DIAGNOSIS — R74.8 ELEVATED ALKALINE PHOSPHATASE LEVEL: ICD-10-CM

## 2021-09-21 PROCEDURE — A9503 TC99M MEDRONATE: HCPCS | Performed by: INTERNAL MEDICINE

## 2021-09-21 PROCEDURE — 78306 BONE IMAGING WHOLE BODY: CPT

## 2021-09-21 PROCEDURE — 3430000000 HC RX DIAGNOSTIC RADIOPHARMACEUTICAL: Performed by: INTERNAL MEDICINE

## 2021-09-21 RX ORDER — TC 99M MEDRONATE 20 MG/10ML
26.5 INJECTION, POWDER, LYOPHILIZED, FOR SOLUTION INTRAVENOUS
Status: COMPLETED | OUTPATIENT
Start: 2021-09-21 | End: 2021-09-21

## 2021-09-21 RX ADMIN — TC 99M MEDRONATE 26.5 MILLICURIE: 20 INJECTION, POWDER, LYOPHILIZED, FOR SOLUTION INTRAVENOUS at 10:15

## 2021-09-22 NOTE — PROGRESS NOTES
The uptake at the junction of the left superior pubic ramus and acetabulum  may be degenerative though is nonspecific. Left hip radiographs are  recommended for further evaluation. Otherwise negative bone scan.     Order X ray

## 2022-02-05 ENCOUNTER — HOSPITAL ENCOUNTER (OUTPATIENT)
Age: 32
Discharge: HOME | End: 2022-02-05
Payer: COMMERCIAL

## 2022-02-05 DIAGNOSIS — R74.8 ELEVATED ALKALINE PHOSPHATASE LEVEL: ICD-10-CM

## 2022-02-05 LAB
A/G RATIO: 1.5 (ref 1.1–2.2)
ALBUMIN SERPL-MCNC: 4.7 G/DL (ref 3.4–5)
ALP BLD-CCNC: 207 U/L (ref 40–129)
ALT SERPL-CCNC: 11 U/L (ref 10–40)
ANION GAP SERPL CALCULATED.3IONS-SCNC: 13 MMOL/L (ref 3–16)
AST SERPL-CCNC: 13 U/L (ref 15–37)
BILIRUB SERPL-MCNC: 0.3 MG/DL (ref 0–1)
BUN BLDV-MCNC: 9 MG/DL (ref 7–20)
CALCIUM SERPL-MCNC: 9.2 MG/DL (ref 8.3–10.6)
CHLORIDE BLD-SCNC: 101 MMOL/L (ref 99–110)
CO2: 24 MMOL/L (ref 21–32)
CREAT SERPL-MCNC: 0.9 MG/DL (ref 0.6–1.1)
GFR AFRICAN AMERICAN: >60
GFR NON-AFRICAN AMERICAN: >60
GLUCOSE BLD-MCNC: 84 MG/DL (ref 70–99)
POTASSIUM SERPL-SCNC: 4.3 MMOL/L (ref 3.5–5.1)
SODIUM BLD-SCNC: 138 MMOL/L (ref 136–145)
TOTAL PROTEIN: 7.8 G/DL (ref 6.4–8.2)

## 2022-02-05 PROCEDURE — 36415 COLL VENOUS BLD VENIPUNCTURE: CPT

## 2022-02-05 PROCEDURE — 80053 COMPREHEN METABOLIC PANEL: CPT

## 2022-02-05 PROCEDURE — 84080 ASSAY ALKALINE PHOSPHATASES: CPT

## 2022-02-07 LAB — ALK PHOS BONE SPECIFIC: 37.3 UG/L

## 2022-02-08 ENCOUNTER — OFFICE VISIT (OUTPATIENT)
Dept: ENDOCRINOLOGY | Age: 32
End: 2022-02-08
Payer: COMMERCIAL

## 2022-02-08 VITALS
BODY MASS INDEX: 30.96 KG/M2 | DIASTOLIC BLOOD PRESSURE: 87 MMHG | OXYGEN SATURATION: 98 % | SYSTOLIC BLOOD PRESSURE: 133 MMHG | WEIGHT: 185.8 LBS | HEIGHT: 65 IN | HEART RATE: 79 BPM

## 2022-02-08 DIAGNOSIS — R74.8 ELEVATED ALKALINE PHOSPHATASE LEVEL: Primary | ICD-10-CM

## 2022-02-08 DIAGNOSIS — E55.9 VITAMIN D DEFICIENCY: ICD-10-CM

## 2022-02-08 PROCEDURE — 99214 OFFICE O/P EST MOD 30 MIN: CPT | Performed by: INTERNAL MEDICINE

## 2022-02-08 RX ORDER — ERGOCALCIFEROL (VITAMIN D2) 1250 MCG
CAPSULE ORAL
Qty: 24 CAPSULE | Refills: 1 | Status: SHIPPED | OUTPATIENT
Start: 2022-02-08 | End: 2022-05-16 | Stop reason: SDUPTHER

## 2022-02-08 NOTE — PROGRESS NOTES
Subjective:      25 y/o WF who is here for elevated alkaline phosphatase    Referred by Dr. Viri Levy    Per records, she had alkaline phosphatase level elevated twice. GGT was normal    Intermittent elevation for years    11/20 152/139 H  Repeat normal  1/15  250 H  pregnancy  7/14  81  5/11 322 H  pregnancy    Calcium normal  AST/ALT normal    Moderate, uncontrolled no worsening factors    No h/o drinking    No h/o fracture  No bone disease      Current complaints: back pain/neck pain    Reports h/o thalassemia    CT 2015 no liver issue mentioned    Alk phos 163  Bone fraction high  Bone specific 34.7  Vit D 19  PTH nl    Started ergocalciferol 50,000 IU weekly  She has vit D deficiency    Bone scan     The uptake at the junction of the left superior pubic ramus and acetabulum  may be degenerative though is nonspecific. Left hip radiographs are  recommended for further evaluation. Otherwise negative bone scan. Order X ray    2/22 Bone specific Alk Phos  37.3  Alk Phos 207    Past Medical History:   Diagnosis Date    Asthma     Seasonal    HPV-genital warts 2009    Hypertension     Trichomoniasis      History reviewed. No pertinent surgical history. Current Outpatient Medications   Medication Sig Dispense Refill    traZODone (DESYREL) 50 MG tablet TAKE 1 TABLET BY MOUTH EVERY DAY AFTER MEALS      cetirizine (ZYRTEC) 10 MG tablet Take 1 tablet by mouth daily 10 tablet 0    amLODIPine (NORVASC) 5 MG tablet Take 1 tablet by mouth daily 30 tablet 3    NELSY 0.25-35 MG-MCG per tablet Take 0.25-35 mg by mouth daily      ferrous sulfate 325 (65 FE) MG tablet Take 325 mg by mouth daily (with breakfast)      albuterol sulfate HFA (VENTOLIN HFA) 108 (90 BASE) MCG/ACT inhaler Inhale 2 puffs into the lungs every 6 hours as needed for Wheezing      triamcinolone (KENALOG) 0.1 % cream Apply topically 2 times daily Apply topically 2 times daily.       ibuprofen (ADVIL;MOTRIN) 800 MG tablet Take 1 tablet by mouth

## 2022-04-30 ENCOUNTER — HOSPITAL ENCOUNTER (OUTPATIENT)
Age: 32
Discharge: HOME | End: 2022-04-30
Payer: COMMERCIAL

## 2022-04-30 ENCOUNTER — HOSPITAL ENCOUNTER (OUTPATIENT)
Dept: GENERAL RADIOLOGY | Age: 32
Discharge: HOME | End: 2022-04-30
Payer: COMMERCIAL

## 2022-04-30 DIAGNOSIS — R74.8 ELEVATED ALKALINE PHOSPHATASE LEVEL: ICD-10-CM

## 2022-04-30 DIAGNOSIS — E55.9 VITAMIN D DEFICIENCY: ICD-10-CM

## 2022-04-30 DIAGNOSIS — R52 PAIN: ICD-10-CM

## 2022-04-30 PROCEDURE — 36415 COLL VENOUS BLD VENIPUNCTURE: CPT

## 2022-04-30 PROCEDURE — 80053 COMPREHEN METABOLIC PANEL: CPT

## 2022-04-30 PROCEDURE — 82306 VITAMIN D 25 HYDROXY: CPT

## 2022-04-30 PROCEDURE — 73502 X-RAY EXAM HIP UNI 2-3 VIEWS: CPT

## 2022-04-30 PROCEDURE — 84080 ASSAY ALKALINE PHOSPHATASES: CPT

## 2022-05-01 LAB
A/G RATIO: 1.5 (ref 1.1–2.2)
ALBUMIN SERPL-MCNC: 4.9 G/DL (ref 3.4–5)
ALP BLD-CCNC: 171 U/L (ref 40–129)
ALT SERPL-CCNC: 14 U/L (ref 10–40)
ANION GAP SERPL CALCULATED.3IONS-SCNC: 14 MMOL/L (ref 3–16)
AST SERPL-CCNC: 14 U/L (ref 15–37)
BILIRUB SERPL-MCNC: 0.4 MG/DL (ref 0–1)
BUN BLDV-MCNC: 10 MG/DL (ref 7–20)
CALCIUM SERPL-MCNC: 9.5 MG/DL (ref 8.3–10.6)
CHLORIDE BLD-SCNC: 102 MMOL/L (ref 99–110)
CO2: 25 MMOL/L (ref 21–32)
CREAT SERPL-MCNC: 1 MG/DL (ref 0.6–1.1)
GFR AFRICAN AMERICAN: >60
GFR NON-AFRICAN AMERICAN: >60
GLUCOSE BLD-MCNC: 73 MG/DL (ref 70–99)
POTASSIUM SERPL-SCNC: 4.4 MMOL/L (ref 3.5–5.1)
SODIUM BLD-SCNC: 141 MMOL/L (ref 136–145)
TOTAL PROTEIN: 8.2 G/DL (ref 6.4–8.2)
VITAMIN D 25-HYDROXY: 64.8 NG/ML

## 2022-05-03 LAB — ALK PHOS BONE SPECIFIC: 36.2 UG/L

## 2022-05-16 ENCOUNTER — OFFICE VISIT (OUTPATIENT)
Dept: ENDOCRINOLOGY | Age: 32
End: 2022-05-16
Payer: COMMERCIAL

## 2022-05-16 VITALS
SYSTOLIC BLOOD PRESSURE: 109 MMHG | HEIGHT: 65 IN | OXYGEN SATURATION: 95 % | BODY MASS INDEX: 29.66 KG/M2 | WEIGHT: 178 LBS | HEART RATE: 81 BPM | DIASTOLIC BLOOD PRESSURE: 76 MMHG

## 2022-05-16 DIAGNOSIS — E55.9 VITAMIN D DEFICIENCY: ICD-10-CM

## 2022-05-16 DIAGNOSIS — R74.8 ELEVATED ALKALINE PHOSPHATASE LEVEL: Primary | ICD-10-CM

## 2022-05-16 PROCEDURE — 99214 OFFICE O/P EST MOD 30 MIN: CPT | Performed by: INTERNAL MEDICINE

## 2022-05-16 RX ORDER — ERGOCALCIFEROL (VITAMIN D2) 1250 MCG
CAPSULE ORAL
Qty: 24 CAPSULE | Refills: 1 | Status: SHIPPED | OUTPATIENT
Start: 2022-05-16 | End: 2022-09-22 | Stop reason: SDUPTHER

## 2022-05-16 NOTE — PROGRESS NOTES
Subjective:      25 y/o WF who is here for elevated alkaline phosphatase    Referred by Dr. Alyson Zambrano    Interim:  No fracture    Taking vitamin D    Per records, she had alkaline phosphatase level elevated twice. GGT was normal    Intermittent elevation for years    11/20 152/139 H  Repeat normal  1/15  250 H  pregnancy  7/14  81  5/11 322 H  pregnancy    Calcium normal  AST/ALT normal    Moderate, uncontrolled no worsening factors    No h/o drinking    No h/o fracture  No bone disease      Current complaints: back pain/neck pain    Reports h/o thalassemia    CT 2015 no liver issue mentioned    Alk phos 163  Bone fraction high  Bone specific 34.7  Vit D 19  PTH nl    Started ergocalciferol 50,000 IU weekly  She has vit D deficiency    Bone scan     The uptake at the junction of the left superior pubic ramus and acetabulum  may be degenerative though is nonspecific. Left hip radiographs are  recommended for further evaluation. Otherwise negative bone scan. Order X ray    2/22 Bone specific Alk Phos  37.3  Alk Phos 207  4/22  Alk Phos 171  Vit D 64.8 Bone Specific 36.2    Past Medical History:   Diagnosis Date    Asthma     Seasonal    HPV-genital warts 2009    Hypertension     Trichomoniasis      History reviewed. No pertinent surgical history.   Current Outpatient Medications   Medication Sig Dispense Refill    ergocalciferol (DRISDOL) 1.25 MG (90143 UT) capsule 1 capsule twice a week 24 capsule 1    traZODone (DESYREL) 50 MG tablet TAKE 1 TABLET BY MOUTH EVERY DAY AFTER MEALS      cetirizine (ZYRTEC) 10 MG tablet Take 1 tablet by mouth daily 10 tablet 0    amLODIPine (NORVASC) 5 MG tablet Take 1 tablet by mouth daily 30 tablet 3    NELSY 0.25-35 MG-MCG per tablet Take 0.25-35 mg by mouth daily      ferrous sulfate 325 (65 FE) MG tablet Take 325 mg by mouth daily (with breakfast)      albuterol sulfate HFA (VENTOLIN HFA) 108 (90 BASE) MCG/ACT inhaler Inhale 2 puffs into the lungs every 6 hours as needed for Wheezing      triamcinolone (KENALOG) 0.1 % cream Apply topically 2 times daily Apply topically 2 times daily.  ibuprofen (ADVIL;MOTRIN) 800 MG tablet Take 1 tablet by mouth every 8 hours as needed. 120 tablet 3     No current facility-administered medications for this visit. Review of Systems  Please see scanned     Objective: There were no vitals taken for this visit. Wt Readings from Last 3 Encounters:   02/08/22 185 lb 12.8 oz (84.3 kg)   08/24/21 185 lb (83.9 kg)   11/21/20 188 lb 4 oz (85.4 kg)       Constitutional: Well-developed, appears stated age, cooperative, in no acute distress  H/E/N/M/T:atraumatic, normocephalic, external ears, nose, lips normal without lesions  Eyes: Lids, lashes, conjunctivae and sclerae normal, No proptosis, no redness  Neck: supple, symmetrical, no swelling  Skin: No obvious rashes or lesions present. Skin and hair texture normal  Psychiatric: Judgement and Insight:  judgement and insight appear normal  Neuro: Normal without focal findings, speech is normal normal, speech is spontaneous  Chest: No labored breathing, no chest deformity, no stridor  Musculoskeletal: No joint deformity, swelling      Lab Review  No results found for: TSH  No results found for: FREET4      Assessment: 1. Elevated Alkaline Phosphatase: She has it for years, intermittent. ALT/AST normal.Last level slightly elevated. It has been higher in the past but was pregnant at that time. Elevated bone specific alkaline phosphatase. PTH normal.Bone scan did not show Paget's. No mets. Will monitor. 2.Vitamin D deficiency: Twice a week, level improved    Plan: 1. Continue vitamin D  2. Level in 6 months

## 2022-09-21 ENCOUNTER — OFFICE VISIT (OUTPATIENT)
Dept: ENDOCRINOLOGY | Age: 32
End: 2022-09-21
Payer: COMMERCIAL

## 2022-09-21 VITALS
DIASTOLIC BLOOD PRESSURE: 72 MMHG | SYSTOLIC BLOOD PRESSURE: 107 MMHG | WEIGHT: 166 LBS | BODY MASS INDEX: 27.66 KG/M2 | HEART RATE: 68 BPM | OXYGEN SATURATION: 99 % | HEIGHT: 65 IN

## 2022-09-21 DIAGNOSIS — R74.8 ALKALINE PHOSPHATASE ELEVATION: ICD-10-CM

## 2022-09-21 DIAGNOSIS — E55.9 VITAMIN D DEFICIENCY: Primary | ICD-10-CM

## 2022-09-21 DIAGNOSIS — E55.9 VITAMIN D DEFICIENCY: ICD-10-CM

## 2022-09-21 LAB
A/G RATIO: 1.6 (ref 1.1–2.2)
ALBUMIN SERPL-MCNC: 4.7 G/DL (ref 3.4–5)
ALP BLD-CCNC: 154 U/L (ref 40–129)
ALT SERPL-CCNC: 13 U/L (ref 10–40)
ANION GAP SERPL CALCULATED.3IONS-SCNC: 12 MMOL/L (ref 3–16)
AST SERPL-CCNC: 16 U/L (ref 15–37)
BILIRUB SERPL-MCNC: <0.2 MG/DL (ref 0–1)
BUN BLDV-MCNC: 13 MG/DL (ref 7–20)
CALCIUM SERPL-MCNC: 9.5 MG/DL (ref 8.3–10.6)
CHLORIDE BLD-SCNC: 103 MMOL/L (ref 99–110)
CO2: 26 MMOL/L (ref 21–32)
CREAT SERPL-MCNC: 1.1 MG/DL (ref 0.6–1.1)
GFR AFRICAN AMERICAN: >60
GFR NON-AFRICAN AMERICAN: 57
GLUCOSE BLD-MCNC: 73 MG/DL (ref 70–99)
POTASSIUM SERPL-SCNC: 4.7 MMOL/L (ref 3.5–5.1)
SODIUM BLD-SCNC: 141 MMOL/L (ref 136–145)
TOTAL PROTEIN: 7.7 G/DL (ref 6.4–8.2)
VITAMIN D 25-HYDROXY: 86 NG/ML

## 2022-09-21 PROCEDURE — 99214 OFFICE O/P EST MOD 30 MIN: CPT | Performed by: INTERNAL MEDICINE

## 2022-09-21 NOTE — PROGRESS NOTES
Subjective:      27 y/o WF who is here for elevated alkaline phosphatase    Referred by Dr. Tana Sultana    Interim:  No fracture  + knee pain    Taking vitamin D    Per records, she had alkaline phosphatase level elevated twice. GGT was normal    Intermittent elevation for years    11/20 152/139 H  Repeat normal  1/15  250 H  pregnancy  7/14  81  5/11 322 H  pregnancy    Calcium normal  AST/ALT normal    Moderate, uncontrolled no worsening factors    No h/o drinking    No h/o fracture  No bone disease      Current complaints: back pain/neck pain    Reports h/o thalassemia    CT 2015 no liver issue mentioned    Alk phos 163  Bone fraction high  Bone specific 34.7  Vit D 19  PTH nl    Started ergocalciferol 50,000 IU weekly  She has vit D deficiency    Bone scan     The uptake at the junction of the left superior pubic ramus and acetabulum  may be degenerative though is nonspecific. Left hip radiographs are  recommended for further evaluation. Otherwise negative bone scan. Order X ray    2/22 Bone specific Alk Phos  37.3  Alk Phos 207  4/22  Alk Phos 171  Vit D 64.8 Bone Specific 36.2    No h/o heavy drinking  Did have some last weekend as birthday    Past Medical History:   Diagnosis Date    Asthma     Seasonal    HPV-genital warts 2009    Hypertension     Trichomoniasis      No past surgical history on file.   Current Outpatient Medications   Medication Sig Dispense Refill    ergocalciferol (DRISDOL) 1.25 MG (37337 UT) capsule 1 capsule twice a week 24 capsule 1    traZODone (DESYREL) 50 MG tablet TAKE 1 TABLET BY MOUTH EVERY DAY AFTER MEALS      cetirizine (ZYRTEC) 10 MG tablet Take 1 tablet by mouth daily 10 tablet 0    amLODIPine (NORVASC) 5 MG tablet Take 1 tablet by mouth daily 30 tablet 3    NELSY 0.25-35 MG-MCG per tablet Take 0.25-35 mg by mouth daily      ferrous sulfate 325 (65 FE) MG tablet Take 325 mg by mouth daily (with breakfast)      albuterol sulfate HFA (VENTOLIN HFA) 108 (90 BASE) MCG/ACT inhaler Inhale 2 puffs into the lungs every 6 hours as needed for Wheezing      triamcinolone (KENALOG) 0.1 % cream Apply topically 2 times daily Apply topically 2 times daily. ibuprofen (ADVIL;MOTRIN) 800 MG tablet Take 1 tablet by mouth every 8 hours as needed. 120 tablet 3     No current facility-administered medications for this visit. Review of Systems  Please see scanned     Objective: There were no vitals taken for this visit. Wt Readings from Last 3 Encounters:   05/16/22 178 lb (80.7 kg)   02/08/22 185 lb 12.8 oz (84.3 kg)   08/24/21 185 lb (83.9 kg)       Constitutional: Well-developed, appears stated age, cooperative, in no acute distress  H/E/N/M/T:atraumatic, normocephalic, external ears, nose, lips normal without lesions  Eyes: Lids, lashes, conjunctivae and sclerae normal, No proptosis, no redness  Neck: supple, symmetrical, no swelling  Skin: No obvious rashes or lesions present. Skin and hair texture normal  Psychiatric: Judgement and Insight:  judgement and insight appear normal  Neuro: Normal without focal findings, speech is normal normal, speech is spontaneous  Chest: No labored breathing, no chest deformity, no stridor  Musculoskeletal: No joint deformity, swelling      Lab Review  No results found for: TSH  No results found for: FREET4      Assessment: 1. Elevated Alkaline Phosphatase: She has it for years, intermittent. ALT/AST normal.Last level slightly elevated. It has been higher in the past but was pregnant at that time. Elevated bone specific alkaline phosphatase. PTH normal.Bone scan did not show Paget's. No mets. Will monitor. 2.Vitamin D deficiency: Twice a week, level improved    Plan: 1. Continue vitamin D 50,000 IU twice a week  2. Check levels

## 2022-09-22 RX ORDER — ERGOCALCIFEROL (VITAMIN D2) 1250 MCG
CAPSULE ORAL
Qty: 24 CAPSULE | Refills: 1 | Status: SHIPPED | OUTPATIENT
Start: 2022-09-22

## 2022-09-22 NOTE — TELEPHONE ENCOUNTER
Medication:   Requested Prescriptions     Pending Prescriptions Disp Refills    ergocalciferol (DRISDOL) 1.25 MG (38418 UT) capsule 24 capsule 1     Si capsule twice a week       Last Filled:      Patient Phone Number: [phone] (home)     Last appt: 2022  Next appt: 2022    Last Labs DM: No results found for: LABA1C  Last Lipid: No results found for: CHOL, TRIG, HDL, LDLCALC  Last PSA: No results found for: PSA  Last Thyroid: No results found for: TSH, FT3, Y4OMNOI, T4FREE, F7OOVCZ

## 2022-09-24 LAB — ALK PHOS BONE SPECIFIC: 28.2 UG/L

## 2022-12-27 ENCOUNTER — OFFICE VISIT (OUTPATIENT)
Dept: ENDOCRINOLOGY | Age: 32
End: 2022-12-27
Payer: COMMERCIAL

## 2022-12-27 ENCOUNTER — HOSPITAL ENCOUNTER (OUTPATIENT)
Age: 32
Discharge: HOME | End: 2022-12-27
Payer: COMMERCIAL

## 2022-12-27 VITALS
HEIGHT: 65 IN | RESPIRATION RATE: 14 BRPM | SYSTOLIC BLOOD PRESSURE: 134 MMHG | WEIGHT: 169 LBS | BODY MASS INDEX: 28.16 KG/M2 | HEART RATE: 79 BPM | DIASTOLIC BLOOD PRESSURE: 88 MMHG | TEMPERATURE: 98 F

## 2022-12-27 DIAGNOSIS — E55.9 VITAMIN D DEFICIENCY: Primary | ICD-10-CM

## 2022-12-27 DIAGNOSIS — R74.8 ELEVATED ALKALINE PHOSPHATASE LEVEL: ICD-10-CM

## 2022-12-27 DIAGNOSIS — E55.9 VITAMIN D DEFICIENCY: ICD-10-CM

## 2022-12-27 LAB
A/G RATIO: 1.7 (ref 1.1–2.2)
ALBUMIN SERPL-MCNC: 4.6 G/DL (ref 3.4–5)
ALP BLD-CCNC: 144 U/L (ref 40–129)
ALT SERPL-CCNC: 11 U/L (ref 10–40)
ANION GAP SERPL CALCULATED.3IONS-SCNC: 14 MMOL/L (ref 3–16)
AST SERPL-CCNC: 14 U/L (ref 15–37)
BILIRUB SERPL-MCNC: 0.3 MG/DL (ref 0–1)
BUN BLDV-MCNC: 12 MG/DL (ref 7–20)
CALCIUM SERPL-MCNC: 9.3 MG/DL (ref 8.3–10.6)
CHLORIDE BLD-SCNC: 101 MMOL/L (ref 99–110)
CO2: 25 MMOL/L (ref 21–32)
CREAT SERPL-MCNC: 0.9 MG/DL (ref 0.6–1.1)
GFR SERPL CREATININE-BSD FRML MDRD: >60 ML/MIN/{1.73_M2}
GLUCOSE BLD-MCNC: 85 MG/DL (ref 70–99)
POTASSIUM SERPL-SCNC: 4.3 MMOL/L (ref 3.5–5.1)
SODIUM BLD-SCNC: 140 MMOL/L (ref 136–145)
TOTAL PROTEIN: 7.3 G/DL (ref 6.4–8.2)
VITAMIN D 25-HYDROXY: 66.5 NG/ML

## 2022-12-27 PROCEDURE — 80053 COMPREHEN METABOLIC PANEL: CPT

## 2022-12-27 PROCEDURE — 82306 VITAMIN D 25 HYDROXY: CPT

## 2022-12-27 PROCEDURE — 99214 OFFICE O/P EST MOD 30 MIN: CPT | Performed by: INTERNAL MEDICINE

## 2022-12-27 PROCEDURE — 36415 COLL VENOUS BLD VENIPUNCTURE: CPT

## 2022-12-27 RX ORDER — ERGOCALCIFEROL 1.25 MG/1
CAPSULE ORAL
Qty: 4 CAPSULE | Refills: 11 | Status: SHIPPED | OUTPATIENT
Start: 2022-12-27

## 2022-12-27 RX ORDER — AMLODIPINE BESYLATE 10 MG/1
TABLET ORAL
COMMUNITY
Start: 2022-11-23

## 2022-12-27 NOTE — PROGRESS NOTES
Subjective:      25 y/o WF who is here for elevated alkaline phosphatase    Referred by Dr. Elvin Bland    Interim:  No fracture  No issues  Taking vitamin D    Per records, she had alkaline phosphatase level elevated twice. GGT was normal    Intermittent elevation for years    11/20 152/139 H  Repeat normal  1/15  250 H  pregnancy  7/14  81  5/11 322 H  pregnancy    Calcium normal  AST/ALT normal    Moderate, uncontrolled no worsening factors    No h/o drinking    No h/o fracture  No bone disease    Initial complaints: back pain/neck pain    Reports h/o thalassemia    CT 2015 no liver issue mentioned    Alk phos 163  Bone fraction high  Bone specific 34.7  Vit D 19  PTH nl    Started ergocalciferol 50,000 IU weekly  She has vit D deficiency    Bone scan     The uptake at the junction of the left superior pubic ramus and acetabulum  may be degenerative though is nonspecific. Left hip radiographs are  recommended for further evaluation. Otherwise negative bone scan. Order X ray    2/22 Bone specific Alk Phos  37.3  Alk Phos 207  4/22  Alk Phos 171  Vit D 64.8 Bone Specific 36.2    9/22  Alk Phos 154  Vit D 86    Change dose to once a week    No h/o heavy drinking  Did have some last weekend as birthday      Past Medical History:   Diagnosis Date    Asthma     Seasonal    HPV-genital warts 2009    Hypertension     Trichomoniasis      No past surgical history on file.   Current Outpatient Medications   Medication Sig Dispense Refill    amLODIPine (NORVASC) 10 MG tablet TAKE 1 TABLET BY MOUTH EVERY DAY      levonorgestrel (MIRENA) IUD 52 mg 1 Units by IntraUTERine route      Ferrous Fumarate 325 (106 Fe) MG TABS       ergocalciferol (DRISDOL) 1.25 MG (14898 UT) capsule 1 capsule twice a week (Patient taking differently: 1 capsule once a week) 24 capsule 1    traZODone (DESYREL) 50 MG tablet TAKE 1 TABLET BY MOUTH EVERY DAY AFTER MEALS      cetirizine (ZYRTEC) 10 MG tablet Take 1 tablet by mouth daily 10 tablet 0 NELSY 0.25-35 MG-MCG per tablet Take 0.25-35 mg by mouth daily      albuterol sulfate HFA (PROVENTIL;VENTOLIN;PROAIR) 108 (90 Base) MCG/ACT inhaler Inhale 2 puffs into the lungs every 6 hours as needed for Wheezing      ibuprofen (ADVIL;MOTRIN) 800 MG tablet Take 1 tablet by mouth every 8 hours as needed. 120 tablet 3     No current facility-administered medications for this visit. Review of Systems  Please see scanned     Objective:    /88   Pulse 79   Temp 98 °F (36.7 °C)   Resp 14   Ht 5' 5\" (1.651 m)   Wt 169 lb (76.7 kg)   BMI 28.12 kg/m²   Wt Readings from Last 3 Encounters:   12/27/22 169 lb (76.7 kg)   09/21/22 166 lb (75.3 kg)   05/16/22 178 lb (80.7 kg)       Constitutional: Well-developed, appears stated age, cooperative, in no acute distress  H/E/N/M/T:atraumatic, normocephalic, external ears, nose, lips normal without lesions  Eyes: Lids, lashes, conjunctivae and sclerae normal, No proptosis, no redness  Neck: supple, symmetrical, no swelling  Skin: No obvious rashes or lesions present. Skin and hair texture normal  Psychiatric: Judgement and Insight:  judgement and insight appear normal  Neuro: Normal without focal findings, speech is normal normal, speech is spontaneous  Chest: No labored breathing, no chest deformity, no stridor  Musculoskeletal: No joint deformity, swelling      Lab Review  No results found for: TSH  No results found for: FREET4      Assessment: 1. Elevated Alkaline Phosphatase: She has it for years, intermittent. ALT/AST normal.Last level slightly elevated. It has been higher in the past but was pregnant at that time. Elevated bone specific alkaline phosphatase. PTH normal.Bone scan did not show Paget's. No mets. Will monitor. 2.Vitamin D deficiency: Once a week, level improved    Plan: 1. Continue vitamin D 50,000 IU once a week  2. Check levels in 6 month  3. Await lab

## 2023-06-02 ENCOUNTER — HOSPITAL ENCOUNTER (OUTPATIENT)
Age: 33
Discharge: HOME | End: 2023-06-02
Payer: COMMERCIAL

## 2023-06-02 DIAGNOSIS — E55.9 VITAMIN D DEFICIENCY: ICD-10-CM

## 2023-06-02 LAB
25(OH)D3 SERPL-MCNC: 68.6 NG/ML
ALBUMIN SERPL-MCNC: 4.4 G/DL (ref 3.4–5)
ALBUMIN/GLOB SERPL: 1.4 {RATIO} (ref 1.1–2.2)
ALP SERPL-CCNC: 120 U/L (ref 40–129)
ALT SERPL-CCNC: 10 U/L (ref 10–40)
ANION GAP SERPL CALCULATED.3IONS-SCNC: 12 MMOL/L (ref 3–16)
AST SERPL-CCNC: 17 U/L (ref 15–37)
BILIRUB SERPL-MCNC: <0.2 MG/DL (ref 0–1)
BUN SERPL-MCNC: 10 MG/DL (ref 7–20)
CALCIUM SERPL-MCNC: 8.5 MG/DL (ref 8.3–10.6)
CHLORIDE SERPL-SCNC: 100 MMOL/L (ref 99–110)
CO2 SERPL-SCNC: 25 MMOL/L (ref 21–32)
CREAT SERPL-MCNC: 0.9 MG/DL (ref 0.6–1.1)
GFR SERPLBLD CREATININE-BSD FMLA CKD-EPI: >60 ML/MIN/{1.73_M2}
GLUCOSE SERPL-MCNC: 107 MG/DL (ref 70–99)
POTASSIUM SERPL-SCNC: 3.8 MMOL/L (ref 3.5–5.1)
PROT SERPL-MCNC: 7.6 G/DL (ref 6.4–8.2)
SODIUM SERPL-SCNC: 137 MMOL/L (ref 136–145)

## 2023-06-02 PROCEDURE — 82306 VITAMIN D 25 HYDROXY: CPT

## 2023-06-02 PROCEDURE — 80053 COMPREHEN METABOLIC PANEL: CPT

## 2023-06-02 PROCEDURE — 36415 COLL VENOUS BLD VENIPUNCTURE: CPT

## 2023-06-05 ENCOUNTER — OFFICE VISIT (OUTPATIENT)
Dept: ENDOCRINOLOGY | Age: 33
End: 2023-06-05
Payer: COMMERCIAL

## 2023-06-05 VITALS
HEIGHT: 65 IN | SYSTOLIC BLOOD PRESSURE: 125 MMHG | HEART RATE: 68 BPM | WEIGHT: 175 LBS | DIASTOLIC BLOOD PRESSURE: 83 MMHG | OXYGEN SATURATION: 98 % | RESPIRATION RATE: 14 BRPM | TEMPERATURE: 98 F | BODY MASS INDEX: 29.16 KG/M2

## 2023-06-05 DIAGNOSIS — R74.8 ELEVATED ALKALINE PHOSPHATASE LEVEL: Primary | ICD-10-CM

## 2023-06-05 DIAGNOSIS — E55.9 VITAMIN D DEFICIENCY: ICD-10-CM

## 2023-06-05 PROCEDURE — 99213 OFFICE O/P EST LOW 20 MIN: CPT | Performed by: INTERNAL MEDICINE

## 2023-06-05 NOTE — PROGRESS NOTES
NELSY 0.25-35 MG-MCG per tablet Take 0.25-35 mg by mouth daily      albuterol sulfate HFA (PROVENTIL;VENTOLIN;PROAIR) 108 (90 Base) MCG/ACT inhaler Inhale 2 puffs into the lungs every 6 hours as needed for Wheezing      ibuprofen (ADVIL;MOTRIN) 800 MG tablet Take 1 tablet by mouth every 8 hours as needed. 120 tablet 3     No current facility-administered medications for this visit. Review of Systems  Please see scanned     Objective: There were no vitals taken for this visit. Wt Readings from Last 3 Encounters:   12/27/22 169 lb (76.7 kg)   09/21/22 166 lb (75.3 kg)   05/16/22 178 lb (80.7 kg)     There were no vitals filed for this visit. Constitutional: Well-developed, appears stated age, cooperative, in no acute distress  H/E/N/M/T:atraumatic, normocephalic, external ears, nose, lips normal without lesions  Eyes: Lids, lashes, conjunctivae and sclerae normal, No proptosis, no redness  Neck: supple, symmetrical, no swelling  Skin: No obvious rashes or lesions present. Skin and hair texture normal  Psychiatric: Judgement and Insight:  judgement and insight appear normal  Neuro: Normal without focal findings, speech is normal normal, speech is spontaneous  Chest: No labored breathing, no chest deformity, no stridor  Musculoskeletal: No joint deformity, swelling      Lab Review  No results found for: TSH  No results found for: FREET4      Assessment: 1. Elevated Alkaline Phosphatase: She has it for years, intermittent. ALT/AST normal.Last level slightly elevated, now normal. It has been higher in the past but was pregnant at that time. Elevated bone specific alkaline phosphatase. PTH normal.Bone scan did not show Paget's. No mets. Will monitor. 2.Vitamin D deficiency: Once every 2 week, level improved    Plan: 1. Continue vitamin D 50,000 IU once every 2 weeks  2. Check levels in 12 month

## 2023-09-21 ENCOUNTER — HOSPITAL ENCOUNTER (EMERGENCY)
Age: 33
Discharge: HOME | End: 2023-09-21
Attending: EMERGENCY MEDICINE
Payer: COMMERCIAL

## 2023-09-21 ENCOUNTER — APPOINTMENT (OUTPATIENT)
Dept: CT IMAGING | Age: 33
End: 2023-09-21
Payer: COMMERCIAL

## 2023-09-21 VITALS
TEMPERATURE: 98.2 F | HEIGHT: 64 IN | DIASTOLIC BLOOD PRESSURE: 74 MMHG | HEART RATE: 74 BPM | RESPIRATION RATE: 16 BRPM | BODY MASS INDEX: 29.88 KG/M2 | WEIGHT: 175 LBS | OXYGEN SATURATION: 99 % | SYSTOLIC BLOOD PRESSURE: 138 MMHG

## 2023-09-21 DIAGNOSIS — S01.81XA FACIAL LACERATION, INITIAL ENCOUNTER: ICD-10-CM

## 2023-09-21 DIAGNOSIS — S05.02XA ABRASION OF LEFT CORNEA, INITIAL ENCOUNTER: ICD-10-CM

## 2023-09-21 DIAGNOSIS — S09.90XA CLOSED HEAD INJURY, INITIAL ENCOUNTER: Primary | ICD-10-CM

## 2023-09-21 PROCEDURE — 6370000000 HC RX 637 (ALT 250 FOR IP): Performed by: EMERGENCY MEDICINE

## 2023-09-21 PROCEDURE — 12013 RPR F/E/E/N/L/M 2.6-5.0 CM: CPT

## 2023-09-21 PROCEDURE — 70450 CT HEAD/BRAIN W/O DYE: CPT

## 2023-09-21 PROCEDURE — 70486 CT MAXILLOFACIAL W/O DYE: CPT

## 2023-09-21 PROCEDURE — 99284 EMERGENCY DEPT VISIT MOD MDM: CPT

## 2023-09-21 RX ORDER — POLYMYXIN B SULFATE AND TRIMETHOPRIM 1; 10000 MG/ML; [USP'U]/ML
1 SOLUTION OPHTHALMIC EVERY 4 HOURS
Qty: 3 ML | Refills: 0 | Status: SHIPPED | OUTPATIENT
Start: 2023-09-21 | End: 2023-10-01

## 2023-09-21 RX ORDER — POLYMYXIN B SULFATE AND TRIMETHOPRIM 1; 10000 MG/ML; [USP'U]/ML
1 SOLUTION OPHTHALMIC
Status: DISCONTINUED | OUTPATIENT
Start: 2023-09-21 | End: 2023-09-21 | Stop reason: HOSPADM

## 2023-09-21 RX ORDER — TRAMADOL HYDROCHLORIDE 50 MG/1
50 TABLET ORAL ONCE
Status: COMPLETED | OUTPATIENT
Start: 2023-09-21 | End: 2023-09-21

## 2023-09-21 RX ORDER — TETRACAINE HYDROCHLORIDE 5 MG/ML
1 SOLUTION OPHTHALMIC ONCE
Status: COMPLETED | OUTPATIENT
Start: 2023-09-21 | End: 2023-09-21

## 2023-09-21 RX ORDER — TRAMADOL HYDROCHLORIDE 50 MG/1
50 TABLET ORAL EVERY 4 HOURS PRN
Qty: 6 TABLET | Refills: 0 | Status: SHIPPED | OUTPATIENT
Start: 2023-09-21 | End: 2023-09-24

## 2023-09-21 RX ADMIN — POLYMYXIN B SULFATE AND TRIMETHOPRIM 1 DROP: 10000; 1 SOLUTION OPHTHALMIC at 06:31

## 2023-09-21 RX ADMIN — FLUORESCEIN SODIUM 1 MG: 1 STRIP OPHTHALMIC at 06:34

## 2023-09-21 RX ADMIN — TRAMADOL HYDROCHLORIDE 50 MG: 50 TABLET ORAL at 05:12

## 2023-09-21 RX ADMIN — TETRACAINE HYDROCHLORIDE 1 DROP: 5 SOLUTION OPHTHALMIC at 06:35

## 2023-09-21 ASSESSMENT — PAIN DESCRIPTION - LOCATION: LOCATION: FACE

## 2023-09-21 ASSESSMENT — PAIN SCALES - GENERAL
PAINLEVEL_OUTOF10: 5
PAINLEVEL_OUTOF10: 3

## 2023-09-21 ASSESSMENT — PAIN - FUNCTIONAL ASSESSMENT: PAIN_FUNCTIONAL_ASSESSMENT: 0-10

## 2023-09-21 ASSESSMENT — PAIN DESCRIPTION - ORIENTATION: ORIENTATION: LEFT
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
## 2023-09-21 NOTE — ED NOTES
Patient discharged with all belongings (patients earrings and necklace in specimen cup handed to patient) discharge paperwork and prescriptions x 2 sent to [address] [place] [address]. Patient verbalized understanding of discharge instructions and follow up with [facility]. Patient ambulatory with steady gait out of ED with family/friend.         Gabrielle Quiroz RN  09/21/23 [id number]

## 2024-01-04 DIAGNOSIS — E55.9 VITAMIN D DEFICIENCY: ICD-10-CM

## 2024-01-05 RX ORDER — ERGOCALCIFEROL 1.25 MG/1
CAPSULE ORAL
Qty: 4 CAPSULE | Refills: 11 | Status: SHIPPED | OUTPATIENT
Start: 2024-01-05

## 2024-01-05 NOTE — TELEPHONE ENCOUNTER
Medication:   Requested Prescriptions     Pending Prescriptions Disp Refills    vitamin D (ERGOCALCIFEROL) 1.25 MG (01116 UT) CAPS capsule [Pharmacy Med Name: VITAMIN D2 50,000IU (ERGO) CAP RX] 4 capsule 11     Sig: TAKE 1 CAPSULE BY MOUTH 1 TIME A WEEK       Last Filled:      Patient Phone Number: [phone] (home)     Last appt: 6/5/2023   Next appt: 6/5/2024    Last Labs DM:   Lab Results   Component Value Date/Time    VITD25 68.6 06/02/2023 02:13 PM    VITD25 66.5 12/27/2022 08:48 AM

## 2024-06-04 ENCOUNTER — HOSPITAL ENCOUNTER (OUTPATIENT)
Age: 34
Discharge: HOME | End: 2024-06-04
Payer: COMMERCIAL

## 2024-06-04 LAB
25(OH)D3 SERPL-MCNC: 56.7 NG/ML
ALBUMIN SERPL-MCNC: 4.4 G/DL (ref 3.4–5)
ALBUMIN/GLOB SERPL: 1.3 {RATIO} (ref 1.1–2.2)
ALP SERPL-CCNC: 117 U/L (ref 40–129)
ALT SERPL-CCNC: 12 U/L (ref 10–40)
ANION GAP SERPL CALCULATED.3IONS-SCNC: 11 MMOL/L (ref 3–16)
AST SERPL-CCNC: 16 U/L (ref 15–37)
BILIRUB SERPL-MCNC: <0.2 MG/DL (ref 0–1)
BUN SERPL-MCNC: 7 MG/DL (ref 7–20)
CALCIUM SERPL-MCNC: 9.1 MG/DL (ref 8.3–10.6)
CHLORIDE SERPL-SCNC: 100 MMOL/L (ref 99–110)
CO2 SERPL-SCNC: 27 MMOL/L (ref 21–32)
CREAT SERPL-MCNC: 0.9 MG/DL (ref 0.6–1.1)
GFR SERPLBLD CREATININE-BSD FMLA CKD-EPI: 85 ML/MIN/{1.73_M2}
GLUCOSE SERPL-MCNC: 72 MG/DL (ref 70–99)
POTASSIUM SERPL-SCNC: 3.9 MMOL/L (ref 3.5–5.1)
PROT SERPL-MCNC: 7.7 G/DL (ref 6.4–8.2)
SODIUM SERPL-SCNC: 138 MMOL/L (ref 136–145)

## 2024-06-04 PROCEDURE — 82306 VITAMIN D 25 HYDROXY: CPT

## 2024-06-04 PROCEDURE — 36415 COLL VENOUS BLD VENIPUNCTURE: CPT

## 2024-06-04 PROCEDURE — 80053 COMPREHEN METABOLIC PANEL: CPT

## 2024-06-05 ENCOUNTER — OFFICE VISIT (OUTPATIENT)
Dept: ENDOCRINOLOGY | Age: 34
End: 2024-06-05
Payer: COMMERCIAL

## 2024-06-05 VITALS
RESPIRATION RATE: 14 BRPM | SYSTOLIC BLOOD PRESSURE: 127 MMHG | DIASTOLIC BLOOD PRESSURE: 84 MMHG | HEART RATE: 79 BPM | OXYGEN SATURATION: 98 % | HEIGHT: 64 IN | BODY MASS INDEX: 31.41 KG/M2 | WEIGHT: 184 LBS

## 2024-06-05 DIAGNOSIS — R74.8 ALKALINE PHOSPHATASE ELEVATION: ICD-10-CM

## 2024-06-05 DIAGNOSIS — E55.9 VITAMIN D DEFICIENCY: Primary | ICD-10-CM

## 2024-06-05 PROCEDURE — 99214 OFFICE O/P EST MOD 30 MIN: CPT | Performed by: INTERNAL MEDICINE

## 2024-06-05 RX ORDER — ERGOCALCIFEROL 1.25 MG/1
CAPSULE ORAL
Qty: 4 CAPSULE | Refills: 11 | Status: SHIPPED | OUTPATIENT
Start: 2024-06-05

## 2024-06-05 NOTE — PROGRESS NOTES
Subjective:      32 y/o WF who is here for elevated alkaline phosphatase    Referred by Dr. Tyler Saldivar    Interim:    No issues  Taking vitamin D  Every other week    Per records, she had alkaline phosphatase level elevated twice.  GGT was normal    Intermittent elevation for years    11/20 152/139 H  Repeat normal  1/15  250 H  pregnancy  7/14  81  5/11 322 H  pregnancy    Calcium normal  AST/ALT normal    Moderate, controlled no worsening factors    No h/o drinking    No h/o fracture  No bone disease    Initial complaints: back pain/neck pain    Reports h/o thalassemia    CT 2015 no liver issue mentioned    Alk phos 163  Bone fraction high  Bone specific 34.7  Vit D 19  PTH nl    Started ergocalciferol 50,000 IU weekly  She has vit D deficiency    Bone scan     The uptake at the junction of the left superior pubic ramus and acetabulum  may be degenerative though is nonspecific.  Left hip radiographs are  recommended for further evaluation.  Otherwise negative bone scan.    Order X ray    2/22 Bone specific Alk Phos  37.3  Alk Phos 207  4/22  Alk Phos 171  Vit D 64.8 Bone Specific 36.2    9/22  Alk Phos 154  Vit D 86    Change dose to once a week    6/23 Alk Phos 120  Vit D 68.6    6/24 Alk Phos  117  Vit D 56    No h/o heavy drinking        Past Medical History:   Diagnosis Date    Asthma     Seasonal    HPV-genital warts 2009    Hypertension     Trichomoniasis      No past surgical history on file.  Current Outpatient Medications   Medication Sig Dispense Refill    vitamin D (ERGOCALCIFEROL) 1.25 MG (52677 UT) CAPS capsule TAKE 1 CAPSULE BY MOUTH 1 TIME A WEEK 4 capsule 11    amLODIPine (NORVASC) 10 MG tablet TAKE 1 TABLET BY MOUTH EVERY DAY      levonorgestrel (MIRENA) IUD 52 mg 1 Units by IntraUTERine route      Ferrous Fumarate 325 (106 Fe) MG TABS       traZODone (DESYREL) 50 MG tablet TAKE 1 TABLET BY MOUTH EVERY DAY AFTER MEALS      cetirizine (ZYRTEC) 10 MG tablet Take 1 tablet by mouth daily 10 tablet 0

## 2025-06-19 DIAGNOSIS — E55.9 VITAMIN D DEFICIENCY: ICD-10-CM

## 2025-06-19 RX ORDER — ERGOCALCIFEROL 1.25 MG/1
50000 CAPSULE, LIQUID FILLED ORAL WEEKLY
Qty: 4 CAPSULE | Refills: 11 | OUTPATIENT
Start: 2025-06-19

## 2025-07-30 DIAGNOSIS — E55.9 VITAMIN D DEFICIENCY: ICD-10-CM

## 2025-07-30 RX ORDER — ERGOCALCIFEROL 1.25 MG/1
CAPSULE, LIQUID FILLED ORAL
Qty: 4 CAPSULE | Refills: 11 | Status: SHIPPED | OUTPATIENT
Start: 2025-07-30

## 2025-09-01 ENCOUNTER — APPOINTMENT (OUTPATIENT)
Dept: GENERAL RADIOLOGY | Age: 35
End: 2025-09-01
Payer: COMMERCIAL

## 2025-09-01 ENCOUNTER — HOSPITAL ENCOUNTER (EMERGENCY)
Age: 35
Discharge: HOME | End: 2025-09-01
Attending: STUDENT IN AN ORGANIZED HEALTH CARE EDUCATION/TRAINING PROGRAM
Payer: COMMERCIAL

## 2025-09-01 VITALS
WEIGHT: 190 LBS | HEIGHT: 64 IN | SYSTOLIC BLOOD PRESSURE: 136 MMHG | DIASTOLIC BLOOD PRESSURE: 91 MMHG | TEMPERATURE: 98.3 F | BODY MASS INDEX: 32.44 KG/M2 | OXYGEN SATURATION: 98 % | RESPIRATION RATE: 16 BRPM | HEART RATE: 66 BPM

## 2025-09-01 DIAGNOSIS — S86.911S KNEE STRAIN, RIGHT, SEQUELA: Primary | ICD-10-CM

## 2025-09-01 PROCEDURE — 99283 EMERGENCY DEPT VISIT LOW MDM: CPT

## 2025-09-01 PROCEDURE — 73562 X-RAY EXAM OF KNEE 3: CPT

## 2025-09-01 RX ORDER — METOPROLOL TARTRATE 25 MG/1
TABLET, FILM COATED ORAL
COMMUNITY
Start: 2024-12-11

## 2025-09-01 ASSESSMENT — PAIN - FUNCTIONAL ASSESSMENT
PAIN_FUNCTIONAL_ASSESSMENT: 0-10
PAIN_FUNCTIONAL_ASSESSMENT: 0-10

## 2025-09-01 ASSESSMENT — PAIN SCALES - GENERAL
PAINLEVEL_OUTOF10: 0
PAINLEVEL_OUTOF10: 0